# Patient Record
Sex: FEMALE | Race: WHITE | NOT HISPANIC OR LATINO | Employment: FULL TIME | URBAN - METROPOLITAN AREA
[De-identification: names, ages, dates, MRNs, and addresses within clinical notes are randomized per-mention and may not be internally consistent; named-entity substitution may affect disease eponyms.]

---

## 2017-01-25 RX ORDER — LEVONORGESTREL/ETHIN.ESTRADIOL 0.1-0.02MG
1 TABLET ORAL DAILY
Qty: 84 TABLET | Refills: 0 | Status: SHIPPED | OUTPATIENT
Start: 2017-01-25 | End: 2017-02-28

## 2017-01-25 NOTE — TELEPHONE ENCOUNTER
Declan      Last Written Prescription Date: 08/07/16  Last Fill Quantity: 84,  # refills: 1   Last Office Visit with [facility], [facility] or [facility] prescribing provider: 04/29/16                                               Thank you -  Kirstin Coreas, Pharmacy Technician  [facility]  On Behalf Of [facility]

## 2017-02-28 ENCOUNTER — OFFICE VISIT (OUTPATIENT)
Dept: FAMILY MEDICINE | Facility: CLINIC | Age: 32
End: 2017-02-28
Payer: COMMERCIAL

## 2017-02-28 ENCOUNTER — RESULT FOLLOW UP (OUTPATIENT)
Dept: FAMILY MEDICINE | Facility: CLINIC | Age: 32
End: 2017-02-28

## 2017-02-28 VITALS
SYSTOLIC BLOOD PRESSURE: 112 MMHG | BODY MASS INDEX: 19.3 KG/M2 | TEMPERATURE: 99.1 F | HEIGHT: 61 IN | WEIGHT: 102.2 LBS | DIASTOLIC BLOOD PRESSURE: 73 MMHG

## 2017-02-28 DIAGNOSIS — Z30.41 ENCOUNTER FOR SURVEILLANCE OF CONTRACEPTIVE PILLS: ICD-10-CM

## 2017-02-28 DIAGNOSIS — Z01.419 ENCOUNTER FOR GYNECOLOGICAL EXAMINATION WITHOUT ABNORMAL FINDING: Primary | ICD-10-CM

## 2017-02-28 DIAGNOSIS — K21.9 GASTROESOPHAGEAL REFLUX DISEASE WITHOUT ESOPHAGITIS: ICD-10-CM

## 2017-02-28 DIAGNOSIS — J45.30 MILD PERSISTENT ASTHMA WITHOUT COMPLICATION: ICD-10-CM

## 2017-02-28 DIAGNOSIS — N76.0 BV (BACTERIAL VAGINOSIS): ICD-10-CM

## 2017-02-28 DIAGNOSIS — R39.9 SYMPTOMS INVOLVING URINARY SYSTEM: ICD-10-CM

## 2017-02-28 DIAGNOSIS — Z11.3 SCREEN FOR STD (SEXUALLY TRANSMITTED DISEASE): ICD-10-CM

## 2017-02-28 DIAGNOSIS — B96.89 BV (BACTERIAL VAGINOSIS): ICD-10-CM

## 2017-02-28 DIAGNOSIS — R87.810 CERVICAL HIGH RISK HPV (HUMAN PAPILLOMAVIRUS) TEST POSITIVE: ICD-10-CM

## 2017-02-28 LAB
ALBUMIN UR-MCNC: NEGATIVE MG/DL
APPEARANCE UR: CLEAR
BILIRUB UR QL STRIP: NEGATIVE
COLOR UR AUTO: YELLOW
GLUCOSE UR STRIP-MCNC: NEGATIVE MG/DL
HGB UR QL STRIP: ABNORMAL
KETONES UR STRIP-MCNC: NEGATIVE MG/DL
LEUKOCYTE ESTERASE UR QL STRIP: NEGATIVE
MICRO REPORT STATUS: ABNORMAL
NITRATE UR QL: NEGATIVE
NON-SQ EPI CELLS #/AREA URNS LPF: ABNORMAL /LPF
PH UR STRIP: 7 PH (ref 5–7)
RBC #/AREA URNS AUTO: ABNORMAL /HPF (ref 0–2)
SP GR UR STRIP: 1.01 (ref 1–1.03)
SPECIMEN SOURCE: ABNORMAL
URN SPEC COLLECT METH UR: ABNORMAL
UROBILINOGEN UR STRIP-ACNC: 0.2 EU/DL (ref 0.2–1)
WBC #/AREA URNS AUTO: ABNORMAL /HPF (ref 0–2)
WET PREP SPEC: ABNORMAL

## 2017-02-28 PROCEDURE — 86803 HEPATITIS C AB TEST: CPT | Performed by: NURSE PRACTITIONER

## 2017-02-28 PROCEDURE — G0145 SCR C/V CYTO,THINLAYER,RESCR: HCPCS | Performed by: NURSE PRACTITIONER

## 2017-02-28 PROCEDURE — 87210 SMEAR WET MOUNT SALINE/INK: CPT | Performed by: NURSE PRACTITIONER

## 2017-02-28 PROCEDURE — 87624 HPV HI-RISK TYP POOLED RSLT: CPT | Performed by: NURSE PRACTITIONER

## 2017-02-28 PROCEDURE — 87591 N.GONORRHOEAE DNA AMP PROB: CPT | Performed by: NURSE PRACTITIONER

## 2017-02-28 PROCEDURE — 81001 URINALYSIS AUTO W/SCOPE: CPT | Performed by: NURSE PRACTITIONER

## 2017-02-28 PROCEDURE — 86706 HEP B SURFACE ANTIBODY: CPT | Performed by: NURSE PRACTITIONER

## 2017-02-28 PROCEDURE — 36415 COLL VENOUS BLD VENIPUNCTURE: CPT | Performed by: NURSE PRACTITIONER

## 2017-02-28 PROCEDURE — 86780 TREPONEMA PALLIDUM: CPT | Performed by: NURSE PRACTITIONER

## 2017-02-28 PROCEDURE — 87389 HIV-1 AG W/HIV-1&-2 AB AG IA: CPT | Performed by: NURSE PRACTITIONER

## 2017-02-28 PROCEDURE — 99395 PREV VISIT EST AGE 18-39: CPT | Performed by: NURSE PRACTITIONER

## 2017-02-28 PROCEDURE — 87491 CHLMYD TRACH DNA AMP PROBE: CPT | Performed by: NURSE PRACTITIONER

## 2017-02-28 PROCEDURE — 87340 HEPATITIS B SURFACE AG IA: CPT | Performed by: NURSE PRACTITIONER

## 2017-02-28 RX ORDER — FLUTICASONE PROPIONATE 110 UG/1
2 AEROSOL, METERED RESPIRATORY (INHALATION) 2 TIMES DAILY
Qty: 1 INHALER | Refills: 11 | Status: SHIPPED | OUTPATIENT
Start: 2017-02-28 | End: 2019-02-21

## 2017-02-28 RX ORDER — LEVONORGESTREL/ETHIN.ESTRADIOL 0.1-0.02MG
1 TABLET ORAL DAILY
Qty: 84 TABLET | Refills: 4 | Status: SHIPPED | OUTPATIENT
Start: 2017-02-28 | End: 2018-03-09

## 2017-02-28 RX ORDER — ALBUTEROL SULFATE 90 UG/1
2 AEROSOL, METERED RESPIRATORY (INHALATION) EVERY 6 HOURS PRN
Qty: 1 INHALER | Refills: 5 | Status: SHIPPED | OUTPATIENT
Start: 2017-02-28 | End: 2019-02-21

## 2017-02-28 RX ORDER — METRONIDAZOLE 500 MG/1
500 TABLET ORAL 2 TIMES DAILY
Qty: 14 TABLET | Refills: 0 | Status: SHIPPED | OUTPATIENT
Start: 2017-02-28 | End: 2017-09-18

## 2017-02-28 NOTE — PROGRESS NOTES
SUBJECTIVE:     CC: Krista Reddy is an 31 year old woman who presents for preventive health visit.   Chief Complaint   Patient presents with     Physical     physical, pappe, reill all meds, Due for AAP ( letter saved)     UTI     STD     wants testing for STD's ALL        Healthy Habits:    Do you get at least three servings of calcium containing foods daily (dairy, green leafy vegetables, etc.)? yes    Amount of exercise or daily activities, outside of work: 1-2 day(s) per week    Problems taking medications regularly No    Medication side effects: No    Have you had an eye exam in the past two years? yes    Do you see a dentist twice per year? yes    Do you have sleep apnea, excessive snoring or daytime drowsiness?no        URINARY TRACT SYMPTOMS      Duration: 2/20/17    Description  Discomfort at the end of her urine stream     Intensity:  moderate    Accompanying signs and symptoms:  Fever/chills: no   Flank pain no   Nausea and vomiting: no   Vaginal symptoms: none  Abdominal/Pelvic Pain: no     History  History of frequent UTI's: no   History of kidney stones: no   Sexually Active: YES  Possibility of pregnancy: No    Precipitating or alleviating factors: None    Therapies tried and outcome: cranberry juice  and increase fluid intake   Outcome: helps       Today's PHQ-2 Score:   PHQ-2 ( 1999 Pf[employer]) 2/28/2017 3/28/2016   Q1: Little interest or pleasure in doing things 0 0   Q2: Feeling down, depressed or hopeless 0 0   PHQ-2 Score 0 0       Abuse: Current or Past(Physical, Sexual or Emotional)- No  Do you feel safe in your environment - Yes    Social History   Substance Use Topics     Smoking status: Former Smoker     Smokeless tobacco: Never Used     Alcohol use Yes      Comment: 3 times per week .      The patient does not drink >3 drinks per day nor >7 drinks per week.    No results for input(s): CHOL, HDL, LDL, TRIG, CHOLHDLRATIO, NHDL in the last 03836 hours.    Reviewed orders with patient.   "Reviewed health maintenance and updated orders accordingly - Yes    Mammo Decision Support:  Mammogram not appropriate for this patient based on age.      History of abnormal Pap smear: NO - age 30- 65 PAP every 3 years recommended    Reviewed and updated as needed this visit by clinical staff  Tobacco  Allergies  Meds  Med Hx  Surg Hx  Fam Hx  Soc Hx        Reviewed and updated as needed this visit by Provider            ROS:  C: NEGATIVE for fever, chills, change in weight  I: NEGATIVE for worrisome rashes, moles or lesions  E: NEGATIVE for vision changes or irritation  ENT: NEGATIVE for ear, mouth and throat problems  R: NEGATIVE for significant cough or SOB  B: NEGATIVE for masses, tenderness or discharge  CV: NEGATIVE for chest pain, palpitations or peripheral edema  GI: NEGATIVE for nausea, abdominal pain, heartburn, or change in bowel habits  : NEGATIVE for unusual urinary or vaginal symptoms. Periods are regular.  M: NEGATIVE for significant arthralgias or myalgia  N: NEGATIVE for weakness, dizziness or paresthesias  P: NEGATIVE for changes in mood or affect    Problem list, Medication list, Allergies, and Medical/Social/Surgical histories reviewed in [facility] and updated as appropriate.        OBJECTIVE:     /73 (BP Location: Left arm, Patient Position: Chair, Cuff Size: Adult Regular)  Temp 99.1  F (37.3  C) (Tympanic)  Ht 5' 0.75\" (1.543 m)  Wt 102 lb 3.2 oz (46.4 kg)  LMP 02/18/2017 (Exact Date)  Breastfeeding? No  BMI 19.47 kg/m2  EXAM:  GENERAL: healthy, alert and no distress  EYES: Eyes grossly normal to inspection, PERRL and conjunctivae and sclerae normal  HENT: ear canals and TM's normal, nose and mouth without ulcers or lesions  NECK: no adenopathy, no asymmetry, masses, or scars and thyroid normal to palpation  RESP: lungs clear to auscultation - no rales, rhonchi or wheezes  BREAST: normal without masses, tenderness or nipple discharge and no palpable axillary masses or adenopathy  CV: " regular rate and rhythm, normal S1 S2, no S3 or S4, no murmur, click or rub, no peripheral edema and peripheral pulses strong  ABDOMEN: soft, nontender, no hepatosplenomegaly, no masses and bowel sounds normal   (female): normal female external genitalia, normal urethral meatus, vaginal mucosa pink, moist, well rugated, and normal cervix/adnexa/uterus without masses   (female): vaginal discharge - copious and white  MS: no gross musculoskeletal defects noted, no edema  SKIN: no suspicious lesions or rashes  NEURO: Normal strength and tone, mentation intact and speech normal  PSYCH: mentation appears normal, affect normal/bright    ASSESSMENT/PLAN:         ICD-10-CM    1. Encounter for gynecological examination without abnormal finding Z01.419 Pap imaged thin layer screen with HPV - recommended age 30 - 65 years (select HPV order below)     HPV High Risk Types DNA Cervical   2. Mild persistent asthma without complication J45.30 fluticasone (FLOVENT HFA) 110 MCG/ACT Inhaler     albuterol (PROAIR HFA/PROVENTIL HFA/VENTOLIN HFA) 108 (90 BASE) MCG/ACT Inhaler     Asthma Action Plan (AAP)   3. Gastroesophageal reflux disease without esophagitis K21.9 ranitidine (ZANTAC) 150 MG tablet   4. Encounter for surveillance of contraceptive pills Z30.41 levonorgestrel-ethinyl estradiol (AVIANE,ALESSE,LESSINA) 0.1-20 MG-MCG per tablet   5. BV (bacterial vaginosis) N76.0 metroNIDAZOLE (FLAGYL) 500 MG tablet    B96.89    6. Screen for STD (sexually transmitted disease) Z11.3 NEISSERIA GONORRHOEA PCR     CHLAMYDIA TRACHOMATIS PCR     HIV Antigen Antibody Combo     Anti Treponema     Hepatitis C antibody     Hepatitis B Surface Antibody     Hepatitis B surface antigen   7. Symptoms involving urinary system R39.9 *UA reflex to Microscopic and Culture ([facility] and [facility] (except [place] and [place])     Urine Microscopic     Wet prep     Patient tans in a tanning bed regularly - discussed risks of skin cancer  "and advised her to discontinue tanning.      COUNSELING:   Reviewed preventive health counseling, as reflected in patient instructions         reports that she has quit smoking. She has never used smokeless tobacco.    Estimated body mass index is 19.47 kg/(m^2) as calculated from the following:    Height as of this encounter: 5' 0.75\" (1.543 m).    Weight as of this encounter: 102 lb 3.2 oz (46.4 kg).         The risks, benefits and treatment options of prescribed medications or other treatments have been discussed with the patient. The patient verbalized their understanding and should call or follow up if no improvement or if they develop further problems.    RAVI Salvador [facility]  "

## 2017-02-28 NOTE — PATIENT INSTRUCTIONS
Preventive Health Recommendations  Female Ages 26 - 39  Yearly exam:   See your health care provider every year in order to    Review health changes.     Discuss preventive care.      Review your medicines if you your doctor has prescribed any.    Until age 30: Get a Pap test every three years (more often if you have had an abnormal result).    After age 30: Talk to your doctor about whether you should have a Pap test every 3 years or have a Pap test with HPV screening every 5 years.   You do not need a Pap test if your uterus was removed (hysterectomy) and you have not had cancer.  You should be tested each year for STDs (sexually transmitted diseases), if you're at risk.   Talk to your provider about how often to have your cholesterol checked.  If you are at risk for diabetes, you should have a diabetes test (fasting glucose).  Shots: Get a flu shot each year. Get a tetanus shot every 10 years.   Nutrition:     Eat at least 5 servings of fruits and vegetables each day.    Eat whole-grain bread, whole-wheat pasta and brown rice instead of white grains and rice.    Talk to your provider about Calcium and Vitamin D.     Lifestyle    Exercise at least 150 minutes a week (30 minutes a day, 5 days of the week). This will help you control your weight and prevent disease.    Limit alcohol to one drink per day.    No smoking.     Wear sunscreen to prevent skin cancer.    See your dentist every six months for an exam and cleaning.        Thank you for choosing [facility].  You may be receiving a survey in the mail from Wip[employer] Anthony regarding your visit today.  Please take a few minutes to complete and return the survey to let us know how we are doing.      If you have questions or concerns, please contact us via [employer] or you can contact your care team at [phone].    Our Clinic hours are:  Monday 6:40 am  to 7:00 pm  Tuesday -Friday 6:40 am to 5:00 pm    The [place] outpatient lab hours are:  Monday - Friday  6:10 am to 4:45 pm  Saturdays 7:00 am to 11:00 am  Appointments are required, call [phone]    If you have clinical questions after hours or would like to schedule an appointment,  call the clinic at [phone].

## 2017-02-28 NOTE — MR AVS SNAPSHOT
After Visit Summary   2/28/2017    Krista Reddy    MRN: [id number]           Patient Information     Date Of Birth          1985        Visit Information        Provider Department      2/28/2017 10:40 Shakila Mendez APRN [facility]        Today's Diagnoses     Encounter for gynecological examination without abnormal finding    -  1    Mild persistent asthma without complication        Gastroesophageal reflux disease without esophagitis        Encounter for surveillance of contraceptive pills        Screen for STD (sexually transmitted disease)        Symptoms involving urinary system          Care Instructions      Preventive Health Recommendations  Female Ages 26 - 39  Yearly exam:   See your health care provider every year in order to    Review health changes.     Discuss preventive care.      Review your medicines if you your doctor has prescribed any.    Until age 30: Get a Pap test every three years (more often if you have had an abnormal result).    After age 30: Talk to your doctor about whether you should have a Pap test every 3 years or have a Pap test with HPV screening every 5 years.   You do not need a Pap test if your uterus was removed (hysterectomy) and you have not had cancer.  You should be tested each year for STDs (sexually transmitted diseases), if you're at risk.   Talk to your provider about how often to have your cholesterol checked.  If you are at risk for diabetes, you should have a diabetes test (fasting glucose).  Shots: Get a flu shot each year. Get a tetanus shot every 10 years.   Nutrition:     Eat at least 5 servings of fruits and vegetables each day.    Eat whole-grain bread, whole-wheat pasta and brown rice instead of white grains and rice.    Talk to your provider about Calcium and Vitamin D.     Lifestyle    Exercise at least 150 minutes a week (30 minutes a day, 5 days of the week). This will help you control your weight  "and prevent disease.    Limit alcohol to one drink per day.    No smoking.     Wear sunscreen to prevent skin cancer.    See your dentist every six months for an exam and cleaning.        Thank you for choosing [facility].  You may be receiving a survey in the mail from Elly Cruz regarding your visit today.  Please take a few minutes to complete and return the survey to let us know how we are doing.      If you have questions or concerns, please contact us via [employer] or you can contact your care team at [phone].    Our Clinic hours are:  Monday 6:40 am  to 7:00 pm  Tuesday -Friday 6:40 am to 5:00 pm    The [place] outpatient lab hours are:  Monday - Friday 6:10 am to 4:45 pm  Saturdays 7:00 am to 11:00 am  Appointments are required, call [phone]    If you have clinical questions after hours or would like to schedule an appointment,  call the clinic at [phone].          Follow-ups after your visit        Who to contact     If you have questions or need follow up information about today's clinic visit or your schedule please contact [facility] directly at [phone].  Normal or non-critical lab and imaging results will be communicated to you by [employer], letter or phone within 4 business days after the clinic has received the results. If you do not hear from us within 7 days, please contact the clinic through [employer] or phone. If you have a critical or abnormal lab result, we will notify you by phone as soon as possible.  Submit refill requests through [employer] or call your pharmacy and they will forward the refill request to us. Please allow 3 business days for your refill to be completed.          Additional Information About Your Visit        Therative Information     [employer] lets you send messages to your doctor, view your test results, renew your prescriptions, schedule appointments and more. To sign up, go to [email or web address] . Click on \"Log in\" on the left side of the " "screen, which will take you to the Welcome page. Then click on \"Sign up Now\" on the right side of the page.     You will be asked to enter the access code listed below, as well as some personal information. Please follow the directions to create your username and password.     Your access code is: [email or web address]  Expires: 2017 11:06 AM     Your access code will  in 90 days. If you need help or a new code, please call your [facility] clinic or [phone].        Care EveryWhere ID     This is your Care EveryWhere ID. This could be used by other organizations to access your [facility] medical records  [id number]        Your Vitals Were     Temperature Height Last Period Breastfeeding? BMI (Body Mass Index)       99.1  F (37.3  C) (Tympanic) 5' 0.75\" (1.543 m) 2017 (Exact Date) No 19.47 kg/m2        Blood Pressure from Last 3 Encounters:   17 112/73   16 138/86   16 124/79    Weight from Last 3 Encounters:   17 102 lb 3.2 oz (46.4 kg)   16 101 lb (45.8 kg)   16 98 lb (44.5 kg)              We Performed the Following     *UA reflex to Microscopic and Culture ([facility] and [facility] (except [place] and [place])     Anti Treponema     Asthma Action Plan (AAP)     CHLAMYDIA TRACHOMATIS PCR     Hepatitis B Surface Antibody     Hepatitis B surface antigen     Hepatitis C antibody     HIV Antigen Antibody Combo     HPV High Risk Types DNA Cervical     NEISSERIA GONORRHOEA PCR     Pap imaged thin layer screen with HPV - recommended age 30 - 65 years (select HPV order below)     Urine Microscopic     Wet prep          Where to get your medicines      These medications were sent to [facility] [place], [place] - [address]  [address] [address]     Phone:  [phone]     albuterol 108 (90 BASE) MCG/ACT Inhaler    fluticasone 110 MCG/ACT Inhaler    levonorgestrel-ethinyl estradiol 0.1-20 MG-MCG per tablet    ranitidine 150 MG " tablet          Primary Care Provider Office Phone # Fax #    RAVI Escoto [facility] [phone]       [facility] [address] [address]        Thank you!     Thank you for choosing [facility]  for your care. Our goal is always to provide you with excellent care. Hearing back from our patients is one way we can continue to improve our services. Please take a few minutes to complete the written survey that you may receive in the mail after your visit with us. Thank you!             Your Updated Medication List - Protect others around you: Learn how to safely use, store and throw away your medicines at www.disposemymeds.org.          This list is accurate as of: 2/28/17 11:06 AM.  Always use your most recent med list.                   Brand Name Dispense Instructions for use    albuterol 108 (90 BASE) MCG/ACT Inhaler    PROAIR HFA/PROVENTIL HFA/VENTOLIN HFA    1 Inhaler    Inhale 2 puffs into the lungs every 6 hours as needed for shortness of breath / dyspnea       fluticasone 110 MCG/ACT Inhaler    FLOVENT HFA    1 Inhaler    Inhale 2 puffs into the lungs 2 times daily       levonorgestrel-ethinyl estradiol 0.1-20 MG-MCG per tablet    AVIANE,ALESSE,LESSINA    84 tablet    Take 1 tablet by mouth daily       ranitidine 150 MG tablet    ZANTAC    60 tablet    Take 1 tablet (150 mg) by mouth 2 times daily

## 2017-02-28 NOTE — NURSING NOTE
"Chief Complaint   Patient presents with     Physical     physical, pappe, reill all meds, Due for AAP ( letter saved)     UTI     STD     wants testing for STD's ALL        Initial /73 (BP Location: Left arm, Patient Position: Chair, Cuff Size: Adult Regular)  Temp 99.1  F (37.3  C) (Tympanic)  Ht 5' 0.75\" (1.543 m)  Wt 102 lb 3.2 oz (46.4 kg)  LMP 02/18/2017 (Exact Date)  Breastfeeding? No  BMI 19.47 kg/m2 Estimated body mass index is 19.47 kg/(m^2) as calculated from the following:    Height as of this encounter: 5' 0.75\" (1.543 m).    Weight as of this encounter: 102 lb 3.2 oz (46.4 kg).  Medication Reconciliation: complete    "

## 2017-02-28 NOTE — LETTER
My Asthma Action Plan  Name: Krista Reddy   YOB: 1985  Date: 2/28/2017   My doctor: RAVI Salvador CNP   My clinic: [facility]        My Control Medicine: Flovent   My Rescue Medicine: Albuterol (Proair/Ventolin/Proventil) inhaler l   My Asthma Severity: mild persistent  Avoid your asthma triggers: upper respiratory infections and Seasonal Changes,Smoke               GREEN ZONE     Good Control    I feel good    No cough or wheeze    Can work, sleep and play without asthma symptoms       Take your asthma control medicine every day.     1. If exercise triggers your asthma, take your rescue medication    15 minutes before exercise or sports, and    During exercise if you have asthma symptoms  2. Spacer to use with inhaler: If you have a spacer, make sure to use it with your inhaler             YELLOW ZONE     Getting Worse  I have ANY of these:    I do not feel good    Cough or wheeze    Chest feels tight    Wake up at night   1. Keep taking your Green Zone medications  2. Start taking your rescue medicine:    every 20 minutes for up to 1 hour. Then every 4 hours for 24-48 hours.  3. If you stay in the Yellow Zone for more than 12-24 hours, contact your doctor.  4. If you do not return to the Green Zone in 12-24 hours or you get worse, start taking your oral steroid medicine if prescribed by your provider.           RED ZONE     Medical Alert - Get Help  I have ANY of these:    I feel awful    Medicine is not helping    Breathing getting harder    Trouble walking or talking    Nose opens wide to breathe       1. Take your rescue medicine NOW  2. If your provider has prescribed an oral steroid medicine, start taking it NOW  3. Call your doctor NOW  4. If you are still in the Red Zone after 20 minutes and you have not reached your doctor:    Take your rescue medicine again and    Call 911 or go to the emergency room right away    See your regular doctor within 2 weeks of an  Emergency Room or Urgent Care visit for follow-up treatment.        Electronically signed by:   Annual Reminders:  Meet with Asthma Educator,  Flu Shot in the Fall, consider Pneumonia Vaccination for patients with asthma (aged 19 and older).    Pharmacy: [facility] [place], [place] - [address]                    Asthma Triggers  How To Control Things That Make Your Asthma Worse    Triggers are things that make your asthma worse.  Look at the list below to help you find your triggers and what you can do about them.  You can help prevent asthma flare-ups by staying away from your triggers.      Trigger                                                          What you can do   Cigarette Smoke  Tobacco smoke can make asthma worse. Do not allow smoking in your home, car or around you.  Be sure no one smokes at a child s day care or school.  If you smoke, ask your health care provider for ways to help you quit.  Ask family members to quit too.  Ask your health care provider for a referral to Quit Plan to help you quit smoking, or call 5-328-805-PLAN.     Colds, Flu, Bronchitis  These are common triggers of asthma. Wash your hands often.  Don t touch your eyes, nose or mouth.  Get a flu shot every year.     Dust Mites  These are tiny bugs that live in cloth or carpet. They are too small to see. Wash sheets and blankets in hot water every week.   Encase pillows and mattress in dust mite proof covers.  Avoid having carpet if you can. If you have carpet, vacuum weekly.   Use a dust mask and HEPA vacuum.   Pollen and Outdoor Mold  Some people are allergic to trees, grass, or weed pollen, or molds. Try to keep your windows closed.  Limit time out doors when pollen count is high.   Ask you health care provider about taking medicine during allergy season.     Animal Dander  Some people are allergic to skin flakes, urine or saliva from pets with fur or feathers. Keep pets with fur or feathers out of your home.     If you can t keep the pet outdoors, then keep the pet out of your bedroom.  Keep the bedroom door closed.  Keep pets off cloth furniture and away from stuffed toys.     Mice, Rats, and Cockroaches  Some people are allergic to the waste from these pests.   Cover food and garbage.  Clean up spills and food crumbs.  Store grease in the refrigerator.   Keep food out of the bedroom.   Indoor Mold  This can be a trigger if your home has high moisture. Fix leaking faucets, pipes, or other sources of water.   Clean moldy surfaces.  Dehumidify basement if it is damp and smelly.   Smoke, Strong Odors, and Sprays  These can reduce air quality. Stay away from strong odors and sprays, such as perfume, powder, hair spray, paints, smoke incense, paint, cleaning products, candles and new carpet.   Exercise or Sports  Some people with asthma have this trigger. Be active!  Ask your doctor about taking medicine before sports or exercise to prevent symptoms.    Warm up for 5-10 minutes before and after sports or exercise.     Other Triggers of Asthma  Cold air:  Cover your nose and mouth with a scarf.  Sometimes laughing or crying can be a trigger.  Some medicines and food can trigger asthma.

## 2017-02-28 NOTE — LETTER
[facility]  [address] [address]  [phone]      March 6, 2017    Krista Reddy  [address]  [place] [facility] [place] [address]    Dear Krista,  This letter is in regards to the PAP smear you had done on 2/28/17. Your PAP test result is normal.  Your HPV (Human Papilloma Virus) result showed evidence of a high risk type of HPV. This strain of HPV can sometimes be linked with the possible future development of cervical cancer. Over 50% of the population has some form of HPV at any given time. Your own immune system will often clear this virus. Getting plenty of rest, eating a nutritious, well balanced diet and smoking cessation are all ways that can help boost your immune system and help clear HPV.  Please return in 1 year for your next pap smear & HPV test.  If you have additional questions regarding this result, please call me at [phone].    Sincerely,  Shakila Reilly, RAVI CNP/rlm

## 2017-03-01 ENCOUNTER — TELEPHONE (OUTPATIENT)
Dept: FAMILY MEDICINE | Facility: CLINIC | Age: 32
End: 2017-03-01

## 2017-03-01 DIAGNOSIS — A74.9 CHLAMYDIA: Primary | ICD-10-CM

## 2017-03-01 LAB
C TRACH DNA SPEC QL NAA+PROBE: ABNORMAL
HBV SURFACE AB SERPL IA-ACNC: 0.78 M[IU]/ML
HBV SURFACE AG SERPL QL IA: NONREACTIVE
HCV AB SERPL QL IA: NORMAL
HIV 1+2 AB+HIV1 P24 AG SERPL QL IA: NORMAL
N GONORRHOEA DNA SPEC QL NAA+PROBE: NORMAL
SPECIMEN SOURCE: ABNORMAL
SPECIMEN SOURCE: NORMAL
T PALLIDUM IGG+IGM SER QL: NEGATIVE

## 2017-03-01 RX ORDER — AZITHROMYCIN 500 MG/1
1000 TABLET, FILM COATED ORAL ONCE
Qty: 2 TABLET | Refills: 0 | Status: SHIPPED | OUTPATIENT
Start: 2017-03-01 | End: 2017-03-01

## 2017-03-01 ASSESSMENT — ASTHMA QUESTIONNAIRES: ACT_TOTALSCORE: 22

## 2017-03-01 NOTE — TELEPHONE ENCOUNTER
I called and notified patient.  Antibiotics sent in.  Advised that she needs to notify her partner and her partner needs to be treated.  No intercourse until both she and her partner has been treated for at least one week.  Shakila Reilly, CNP

## 2017-03-02 LAB
COPATH REPORT: NORMAL
PAP: NORMAL

## 2017-03-06 LAB
FINAL DIAGNOSIS: ABNORMAL
HPV HR 12 DNA CVX QL NAA+PROBE: POSITIVE
HPV16 DNA SPEC QL NAA+PROBE: NEGATIVE
HPV18 DNA SPEC QL NAA+PROBE: NEGATIVE
SPECIMEN DESCRIPTION: ABNORMAL

## 2017-03-06 NOTE — PROGRESS NOTES
5/22/14: ASCUS Pap, Neg HPV.  2/28/17: NIL Pap, + HR HPV (Neg 16/18). Plan cotest in 1 year.   2/13/18 Cotest reminder letter sent (rlm)  3/29/18 ASCUS Pap, + HR HPV 16 & other HR HPV (not 18). Plan colp  4/5/18 Message left to return call. Pt notified by phone.  Patient will call the [facility] to schedule a colp.  4/24/18 Holcomb bx and ECC negative. Plan : Co test in 1 year due by 4/24/19 (MRA)  4/30/18 Pt notified.  2/21/19 NIL Pap, + HR HPV 16 & other (neg 18). Plan colp  2/27/19 Message left to return call. Pt notified.   3/19/19 Holcomb BX and ECC- Benign. Plan cotest in 1 year. (MRA)  3/25/19 Message left to return call. Pt notified.  1/16/20 ASCUS pap with Neg HPV. Plan cotest in 1 year. (MRA) (efren)  1/24/20 Result letter sent per RN request (rlm)

## 2017-09-18 ENCOUNTER — OFFICE VISIT (OUTPATIENT)
Dept: FAMILY MEDICINE | Facility: CLINIC | Age: 32
End: 2017-09-18
Payer: COMMERCIAL

## 2017-09-18 VITALS
HEIGHT: 61 IN | WEIGHT: 104 LBS | TEMPERATURE: 99.3 F | HEART RATE: 81 BPM | BODY MASS INDEX: 19.63 KG/M2 | DIASTOLIC BLOOD PRESSURE: 88 MMHG | SYSTOLIC BLOOD PRESSURE: 119 MMHG

## 2017-09-18 DIAGNOSIS — Z11.3 SCREEN FOR STD (SEXUALLY TRANSMITTED DISEASE): Primary | ICD-10-CM

## 2017-09-18 PROCEDURE — 87491 CHLMYD TRACH DNA AMP PROBE: CPT | Performed by: NURSE PRACTITIONER

## 2017-09-18 PROCEDURE — 87591 N.GONORRHOEAE DNA AMP PROB: CPT | Performed by: NURSE PRACTITIONER

## 2017-09-18 PROCEDURE — 99213 OFFICE O/P EST LOW 20 MIN: CPT | Performed by: NURSE PRACTITIONER

## 2017-09-18 NOTE — PATIENT INSTRUCTIONS
Thank you for choosing [facility].  You may be receiving a survey in the mail from Elly Cruz regarding your visit today.  Please take a few minutes to complete and return the survey to let us know how we are doing.      If you have questions or concerns, please contact us via [employer] or you can contact your care team at [phone].    Our Clinic hours are:  Monday 6:40 am  to 7:00 pm  Tuesday -Friday 6:40 am to 5:00 pm    The [place] outpatient lab hours are:  Monday - Friday 6:10 am to 4:45 pm  Saturdays 7:00 am to 11:00 am  Appointments are required, call [phone]    If you have clinical questions after hours or would like to schedule an appointment,  call the clinic at [phone].

## 2017-09-18 NOTE — NURSING NOTE
"Chief Complaint   Patient presents with     STD     patient would like to make sure chalmydia infection has cleared up;  no symptoms       Initial /88  Pulse 81  Temp 99.3  F (37.4  C) (Oral)  Ht 5' 0.75\" (1.543 m)  Wt 104 lb (47.2 kg)  LMP 08/28/2017 (Approximate)  BMI 19.81 kg/m2 Estimated body mass index is 19.81 kg/(m^2) as calculated from the following:    Height as of this encounter: 5' 0.75\" (1.543 m).    Weight as of this encounter: 104 lb (47.2 kg).  Medication Reconciliation: complete  "

## 2017-09-18 NOTE — MR AVS SNAPSHOT
After Visit Summary   9/18/2017    Krista Reddy    MRN: [id number]           Patient Information     Date Of Birth          1985        Visit Information        Provider Department      9/18/2017 10:00 AM Shakila Reilly APRN CNP [facility]        Today's Diagnoses     Screen for STD (sexually transmitted disease)    -  1      Care Instructions          Thank you for choosing [facility].  You may be receiving a survey in the mail from Elly Cruz regarding your visit today.  Please take a few minutes to complete and return the survey to let us know how we are doing.      If you have questions or concerns, please contact us via [employer] or you can contact your care team at [phone].    Our Clinic hours are:  Monday 6:40 am  to 7:00 pm  Tuesday -Friday 6:40 am to 5:00 pm    The [place] outpatient lab hours are:  Monday - Friday 6:10 am to 4:45 pm  Saturdays 7:00 am to 11:00 am  Appointments are required, call [phone]    If you have clinical questions after hours or would like to schedule an appointment,  call the clinic at [phone].            Follow-ups after your visit        Who to contact     If you have questions or need follow up information about today's clinic visit or your schedule please contact [facility] directly at [phone].  Normal or non-critical lab and imaging results will be communicated to you by MyChart, letter or phone within 4 business days after the clinic has received the results. If you do not hear from us within 7 days, please contact the clinic through [employer] or phone. If you have a critical or abnormal lab result, we will notify you by phone as soon as possible.  Submit refill requests through [employer] or call your pharmacy and they will forward the refill request to us. Please allow 3 business days for your refill to be completed.          Additional Information About Your Visit        MyChart Information   "   [employer] lets you send messages to your doctor, view your test results, renew your prescriptions, schedule appointments and more. To sign up, go to [email or web address] . Click on \"Log in\" on the left side of the screen, which will take you to the Welcome page. Then click on \"Sign up Now\" on the right side of the page.     You will be asked to enter the access code listed below, as well as some personal information. Please follow the directions to create your username and password.     Your access code is: [email or web address]  Expires: 2017 10:28 AM     Your access code will  in 90 days. If you need help or a new code, please call your [facility] clinic or [phone].        Care EveryWhere ID     This is your [facility] EveryWhere ID. This could be used by other organizations to access your [facility] medical records  [id number]        Your Vitals Were     Pulse Temperature Height Last Period BMI (Body Mass Index)       81 99.3  F (37.4  C) (Oral) 5' 0.75\" (1.543 m) 2017 (Approximate) 19.81 kg/m2        Blood Pressure from Last 3 Encounters:   17 119/88   17 112/73   16 138/86    Weight from Last 3 Encounters:   17 104 lb (47.2 kg)   17 102 lb 3.2 oz (46.4 kg)   16 101 lb (45.8 kg)              We Performed the Following     CHLAMYDIA TRACHOMATIS PCR     NEISSERIA GONORRHOEA PCR        Primary Care Provider Office Phone # Fax #    Shakila Toy Reilly, APRN [phone] [address] [facility] [address]        Equal Access to Services     BINA HUANG : Bao Colbert, johnathan berry, jeanne kaalmada silas, juanita david. So [facility] [phone].    ATENCIÓN: Si habla español, tiene a taylor disposición servicios gratuitos de asistencia lingüística. Llame al [phone].    We comply with applicable federal civil rights laws and [place] laws. We do not discriminate on the basis of race, color, " national origin, age, disability sex, sexual orientation or gender identity.            Thank you!     Thank you for choosing [facility]  for your care. Our goal is always to provide you with excellent care. Hearing back from our patients is one way we can continue to improve our services. Please take a few minutes to complete the written survey that you may receive in the mail after your visit with us. Thank you!             Your Updated Medication List - Protect others around you: Learn how to safely use, store and throw away your medicines at www.disposemymeds.org.          This list is accurate as of: 9/18/17 10:28 AM.  Always use your most recent med list.                   Brand Name Dispense Instructions for use Diagnosis    albuterol 108 (90 BASE) MCG/ACT Inhaler    PROAIR HFA/PROVENTIL HFA/VENTOLIN HFA    1 Inhaler    Inhale 2 puffs into the lungs every 6 hours as needed for shortness of breath / dyspnea    Mild persistent asthma without complication       fluticasone 110 MCG/ACT Inhaler    FLOVENT HFA    1 Inhaler    Inhale 2 puffs into the lungs 2 times daily    Mild persistent asthma without complication       levonorgestrel-ethinyl estradiol 0.1-20 MG-MCG per tablet    AVIANE,ALESSE,LESSINA    84 tablet    Take 1 tablet by mouth daily    Encounter for surveillance of contraceptive pills       ranitidine 150 MG tablet    ZANTAC    60 tablet    Take 1 tablet (150 mg) by mouth 2 times daily    Gastroesophageal reflux disease without esophagitis

## 2017-09-18 NOTE — PROGRESS NOTES
"  SUBJECTIVE:   Krista Reddy is a 32 year old female who presents to clinic today for the following health issues:      Chief Complaint   Patient presents with     STD     patient would like to make sure chalmydia infection has cleared up;  Chlamydia diagnosed during routine screening at the 2/28/2017 appointment.  Her partner was treated.  She has had no symptoms since taking her antibiotics.           Problem list and histories reviewed & adjusted, as indicated.  Additional history: as documented      Reviewed and updated as needed this visit by clinical staff  Tobacco  Allergies  Meds       Reviewed and updated as needed this visit by Provider         ROS:  Constitutional, HEENT, cardiovascular, pulmonary, gi and gu systems are negative, except as otherwise noted.      OBJECTIVE:   /88  Pulse 81  Temp 99.3  F (37.4  C) (Oral)  Ht 5' 0.75\" (1.543 m)  Wt 104 lb (47.2 kg)  LMP 08/28/2017 (Approximate)  BMI 19.81 kg/m2  Body mass index is 19.81 kg/(m^2).  GENERAL: healthy, alert and no distress   (female): normal female external genitalia, normal urethral meatus, normal vaginal mucosa, normal cervix without discharge        ASSESSMENT/PLAN:       ICD-10-CM    1. Screen for STD (sexually transmitted disease) Z11.3 NEISSERIA GONORRHOEA PCR     CHLAMYDIA TRACHOMATIS PCR       The risks, benefits and treatment options of prescribed medications or other treatments have been discussed with the patient. The patient verbalized their understanding and should call or follow up if no improvement or if they develop further problems.    RAVI Salvador [facility]  "

## 2017-09-19 DIAGNOSIS — A74.9 CHLAMYDIA: Primary | ICD-10-CM

## 2017-09-19 LAB
C TRACH DNA SPEC QL NAA+PROBE: POSITIVE
N GONORRHOEA DNA SPEC QL NAA+PROBE: NEGATIVE
SPECIMEN SOURCE: ABNORMAL
SPECIMEN SOURCE: NORMAL

## 2017-09-19 RX ORDER — AZITHROMYCIN 500 MG/1
1000 TABLET, FILM COATED ORAL ONCE
Qty: 2 TABLET | Refills: 0 | Status: SHIPPED | OUTPATIENT
Start: 2017-09-19 | End: 2017-09-19

## 2017-09-19 ASSESSMENT — ASTHMA QUESTIONNAIRES: ACT_TOTALSCORE: 25

## 2017-12-07 ENCOUNTER — OFFICE VISIT (OUTPATIENT)
Dept: FAMILY MEDICINE | Facility: CLINIC | Age: 32
End: 2017-12-07
Payer: COMMERCIAL

## 2017-12-07 VITALS
TEMPERATURE: 97.2 F | SYSTOLIC BLOOD PRESSURE: 117 MMHG | WEIGHT: 104 LBS | BODY MASS INDEX: 19.81 KG/M2 | DIASTOLIC BLOOD PRESSURE: 77 MMHG | HEART RATE: 84 BPM

## 2017-12-07 DIAGNOSIS — Z11.3 SCREEN FOR STD (SEXUALLY TRANSMITTED DISEASE): Primary | ICD-10-CM

## 2017-12-07 PROCEDURE — 87491 CHLMYD TRACH DNA AMP PROBE: CPT | Performed by: NURSE PRACTITIONER

## 2017-12-07 PROCEDURE — 99213 OFFICE O/P EST LOW 20 MIN: CPT | Performed by: NURSE PRACTITIONER

## 2017-12-07 PROCEDURE — 87591 N.GONORRHOEAE DNA AMP PROB: CPT | Performed by: NURSE PRACTITIONER

## 2017-12-07 NOTE — PROGRESS NOTES
SUBJECTIVE:   Krista Reddy is a 32 year old female who presents to clinic today for the following health issues:      Chief Complaint   Patient presents with     STD     wants to recheck chlamydia infection from September  Took whole course of treatment prescribed.  Denies vaginal discharge or odor  No new partners  Current partner was treated as well.           Problem list and histories reviewed & adjusted, as indicated.  Additional history: as documented      Reviewed and updated as needed this visit by clinical staff  Allergies  Meds       Reviewed and updated as needed this visit by Provider         ROS:  Constitutional, HEENT, cardiovascular, pulmonary, gi and gu systems are negative, except as otherwise noted.      OBJECTIVE:   /77  Pulse 84  Temp 97.2  F (36.2  C) (Oral)  Wt 104 lb (47.2 kg)  LMP 11/16/2017 (Approximate)  BMI 19.81 kg/m2  Body mass index is 19.81 kg/(m^2).  GENERAL: healthy, alert and no distress      ASSESSMENT/PLAN:       ICD-10-CM    1. Screen for STD (sexually transmitted disease) Z11.3 NEISSERIA GONORRHOEA PCR     CHLAMYDIA TRACHOMATIS PCR     The risks, benefits and treatment options of prescribed medications or other treatments have been discussed with the patient. The patient verbalized their understanding and should call or follow up if no improvement or if they develop further problems.    RAVI Salvador [facility]

## 2017-12-07 NOTE — MR AVS SNAPSHOT
"              After Visit Summary   2017    Krista Reddy    MRN: [id number]           Patient Information     Date Of Birth          1985        Visit Information        Provider Department      2017 11:00 AM Shakila Reilly APRN CNP [facility]        Today's Diagnoses     Screen for STD (sexually transmitted disease)    -  1       Follow-ups after your visit        Who to contact     If you have questions or need follow up information about today's clinic visit or your schedule please contact [facility] directly at [phone].  Normal or non-critical lab and imaging results will be communicated to you by [employer], letter or phone within 4 business days after the clinic has received the results. If you do not hear from us within 7 days, please contact the clinic through [employer] or phone. If you have a critical or abnormal lab result, we will notify you by phone as soon as possible.  Submit refill requests through [employer] or call your pharmacy and they will forward the refill request to us. Please allow 3 business days for your refill to be completed.          Additional Information About Your Visit        MyChart Information     [employer] lets you send messages to your doctor, view your test results, renew your prescriptions, schedule appointments and more. To sign up, go to [email or web address] . Click on \"Log in\" on the left side of the screen, which will take you to the Welcome page. Then click on \"Sign up Now\" on the right side of the page.     You will be asked to enter the access code listed below, as well as some personal information. Please follow the directions to create your username and password.     Your access code is: [email or web address]  Expires: 2017  9:28 AM     Your access code will  in 90 days. If you need help or a new code, please call your [facility] or [phone].        Care EveryWhere ID     This is your Care EveryWhere " ID. This could be used by other organizations to access your [facility] medical records  [id number]        Your Vitals Were     Pulse Temperature Last Period BMI (Body Mass Index)          84 97.2  F (36.2  C) (Oral) 11/16/2017 (Approximate) 19.81 kg/m2         Blood Pressure from Last 3 Encounters:   12/07/17 117/77   09/18/17 119/88   02/28/17 112/73    Weight from Last 3 Encounters:   12/07/17 104 lb (47.2 kg)   09/18/17 104 lb (47.2 kg)   02/28/17 102 lb 3.2 oz (46.4 kg)              We Performed the Following     CHLAMYDIA TRACHOMATIS PCR     NEISSERIA GONORRHOEA PCR        Primary Care Provider Office Phone # Fax #    Shakila Yeager RAVI [place] [phone] [address] [facility] [address]        Equal Access to Services     BINA HUANG : Hadii aad ku hadasho Soomaali, waaxda luqadaha, qaybta kaalmada adeegyada, waxay walein hayshreyas kimball . So [facility] [phone].    ATENCIÓN: Si habla español, tiene a taylor disposición servicios gratuitos de asistencia lingüística. Adryan al [phone].    We comply with applicable federal civil rights laws and [place] laws. We do not discriminate on the basis of race, color, national origin, age, disability, sex, sexual orientation, or gender identity.            Thank you!     Thank you for choosing [facility]  for your care. Our goal is always to provide you with excellent care. Hearing back from our patients is one way we can continue to improve our services. Please take a few minutes to complete the written survey that you may receive in the mail after your visit with us. Thank you!             Your Updated Medication List - Protect others around you: Learn how to safely use, store and throw away your medicines at www.disposemymeds.org.          This list is accurate as of: 12/7/17 11:39 AM.  Always use your most recent med list.                   Brand Name Dispense Instructions for use Diagnosis    albuterol  108 (90 BASE) MCG/ACT Inhaler    PROAIR HFA/PROVENTIL HFA/VENTOLIN HFA    1 Inhaler    Inhale 2 puffs into the lungs every 6 hours as needed for shortness of breath / dyspnea    Mild persistent asthma without complication       fluticasone 110 MCG/ACT Inhaler    FLOVENT HFA    1 Inhaler    Inhale 2 puffs into the lungs 2 times daily    Mild persistent asthma without complication       levonorgestrel-ethinyl estradiol 0.1-20 MG-MCG per tablet    AVIANE,ALESSE,LESSINA    84 tablet    Take 1 tablet by mouth daily    Encounter for surveillance of contraceptive pills       ranitidine 150 MG tablet    ZANTAC    60 tablet    Take 1 tablet (150 mg) by mouth 2 times daily    Gastroesophageal reflux disease without esophagitis

## 2017-12-07 NOTE — NURSING NOTE
"Chief Complaint   Patient presents with     STD     wants to recheck chlamydia infection from september       Initial /77  Pulse 84  Temp 97.2  F (36.2  C) (Oral)  Wt 104 lb (47.2 kg)  LMP 11/16/2017 (Approximate)  BMI 19.81 kg/m2 Estimated body mass index is 19.81 kg/(m^2) as calculated from the following:    Height as of 9/18/17: 5' 0.75\" (1.543 m).    Weight as of this encounter: 104 lb (47.2 kg).  Medication Reconciliation: complete  "

## 2017-12-07 NOTE — LETTER
[facility]  [address] [place] [address]  [phone]        December 11, 2017    Krista Phamco  [address]  [place] [facility] [address]            Dear Krista,    The results of you recent STD screening for trichomonas, chlamydia and gonorrhoeae tests were all flavio/negativel.    If you have any questions or concerns, please call me or my nurse at [phone].    Sincerely,        Shakila Reilly CNP

## 2017-12-08 LAB
C TRACH DNA SPEC QL NAA+PROBE: NEGATIVE
N GONORRHOEA DNA SPEC QL NAA+PROBE: NEGATIVE
SPECIMEN SOURCE: NORMAL
SPECIMEN SOURCE: NORMAL

## 2017-12-14 ENCOUNTER — TELEPHONE (OUTPATIENT)
Dept: FAMILY MEDICINE | Facility: CLINIC | Age: 32
End: 2017-12-14

## 2017-12-14 NOTE — TELEPHONE ENCOUNTER
Patient is calling to find out the results of her Ghonnorhea and Chlamydia results. I gave her the results.  Danielle Landin  [facility] [place] [facility]

## 2018-03-09 DIAGNOSIS — Z30.41 ENCOUNTER FOR SURVEILLANCE OF CONTRACEPTIVE PILLS: ICD-10-CM

## 2018-03-09 RX ORDER — LEVONORGESTREL/ETHIN.ESTRADIOL 0.1-0.02MG
1 TABLET ORAL DAILY
Qty: 84 TABLET | Refills: 0 | Status: SHIPPED | OUTPATIENT
Start: 2018-03-09 | End: 2018-03-13

## 2018-03-09 NOTE — TELEPHONE ENCOUNTER
Medication is being filled for 1 time refill only due to:  last Px/pap 2/28/17.    Melly JEFF RN

## 2018-03-09 NOTE — TELEPHONE ENCOUNTER
"Requested Prescriptions   Pending Prescriptions Disp Refills     levonorgestrel-ethinyl estradiol (AVIANE,ANGELIQUE,LESSINA) 0.1-20 MG-MCG per tablet  Last Written Prescription Date:  02/28/17  Last Fill Quantity: 84,  # refills: 4   Last office visit: 12/7/2017 with prescribing provider:  12/07/17   Future Office Visit:     84 tablet 4     Sig: Take 1 tablet by mouth daily    Contraceptives Protocol Passed    3/9/2018  2:08 PM       Passed - Patient is not a current smoker if age is 35 or older       Passed - Recent (12 mo) or future (30 days) visit within the authorizing provider's specialty    Patient had office visit in the last 12 months or has a visit in the next 30 days with authorizing provider or within the authorizing provider's specialty.  See \"Patient Info\" tab in inbasket, or \"Choose Columns\" in Meds & Orders section of the refill encounter.           Passed - No active pregnancy on record       Passed - No positive pregnancy test in past 12 months          "

## 2018-03-13 ENCOUNTER — OFFICE VISIT (OUTPATIENT)
Dept: FAMILY MEDICINE | Facility: CLINIC | Age: 33
End: 2018-03-13
Payer: COMMERCIAL

## 2018-03-13 VITALS
HEART RATE: 72 BPM | BODY MASS INDEX: 18.81 KG/M2 | WEIGHT: 99.6 LBS | DIASTOLIC BLOOD PRESSURE: 68 MMHG | SYSTOLIC BLOOD PRESSURE: 101 MMHG | HEIGHT: 61 IN | TEMPERATURE: 98.1 F

## 2018-03-13 DIAGNOSIS — Z30.013 ENCOUNTER FOR INITIAL PRESCRIPTION OF INJECTABLE CONTRACEPTIVE: Primary | ICD-10-CM

## 2018-03-13 PROCEDURE — 96372 THER/PROPH/DIAG INJ SC/IM: CPT | Performed by: INTERNAL MEDICINE

## 2018-03-13 PROCEDURE — 99213 OFFICE O/P EST LOW 20 MIN: CPT | Mod: 25 | Performed by: INTERNAL MEDICINE

## 2018-03-13 RX ORDER — MEDROXYPROGESTERONE ACETATE 150 MG/ML
150 INJECTION, SUSPENSION INTRAMUSCULAR
Qty: 1 ML | Refills: 3 | OUTPATIENT
Start: 2018-03-13 | End: 2018-12-17

## 2018-03-13 NOTE — MR AVS SNAPSHOT
After Visit Summary   3/13/2018    Krista Reddy    MRN: [id number]           Patient Information     Date Of Birth          1985        Visit Information        Provider Department      3/13/2018 9:40 AM Toy Prado MD [facility]        Today's Diagnoses     Screening for cervical cancer    -  1    Encounter for initial prescription of injectable contraceptive          Care Instructions          Thank you for choosing [facility].  You may be receiving a survey in the mail from [facility] regarding your visit today.  Please take a few minutes to complete and return the survey to let us know how we are doing.      If you have questions or concerns, please contact us via InviBox or you can contact your care team at [phone].    Our Clinic hours are:  Monday 6:40 am  to 7:00 pm  Tuesday -Friday 6:40 am to 5:00 pm    The [place] outpatient lab hours are:  Monday - Friday 6:10 am to 4:45 pm  Saturdays 7:00 am to 11:00 am  Appointments are required, call [phone]    If you have clinical questions after hours or would like to schedule an appointment,  call the clinic at [phone].    Medroxyprogesterone injection [Contraceptive]  Brand Names: Depo-Provera, Depo-subQ Provera 104  What is this medicine?  MEDROXYPROGESTERONE (me DROX ee proe SOLO te jey) contraceptive injections prevent pregnancy. They provide effective birth control for 3 months. Depo-subQ Provera 104 is also used for treating pain related to endometriosis.  How should I use this medicine?  Depo-Provera Contraceptive injection is given into a muscle. Depo-subQ Provera 104 injection is given under the skin. These injections are given by a health care professional. You must not be pregnant before getting an injection. The injection is usually given during the first 5 days after the start of a menstrual period or 6 weeks after delivery of a baby.  Talk to your pediatrician regarding the use of this medicine  in children. Special care may be needed. These injections have been used in female children who have started having menstrual periods.  What side effects may I notice from receiving this medicine?  Side effects that you should report to your doctor or health care professional as soon as possible:    allergic reactions like skin rash, itching or hives, swelling of the face, lips, or tongue    breast tenderness or discharge    breathing problems    changes in vision    depression    feeling faint or lightheaded, falls    fever    pain in the abdomen, chest, groin, or leg    problems with balance, talking, walking    unusually weak or tired    yellowing of the eyes or skin  Side effects that usually do not require medical attention (report to your doctor or health care professional if they continue or are bothersome):    acne    fluid retention and swelling    headache    irregular periods, spotting, or absent periods    temporary pain, itching, or skin reaction at site where injected    weight gain  What may interact with this medicine?  Do not take this medicine with any of the following medications:    bosentan  This medicine may also interact with the following medications:    aminoglutethimide    antibiotics or medicines for infections, especially rifampin, rifabutin, rifapentine, and griseofulvin    aprepitant    barbiturate medicines such as phenobarbital or primidone    bexarotene    carbamazepine    medicines for seizures like ethotoin, felbamate, oxcarbazepine, phenytoin, topiramate    modafinil    [place]'s wort  What if I miss a dose?  Try not to miss a dose. You must get an injection once every 3 months to maintain birth control. If you cannot keep an appointment, call and reschedule it. If you wait longer than 13 weeks between Depo-Provera contraceptive injections or longer than 14 weeks between Depo-subQ Provera 104 injections, you could get pregnant. Use another method for birth control if you miss  your appointment. You may also need a pregnancy test before receiving another injection.  Where should I keep my medicine?  This does not apply. The injection will be given to you by a health care professional.  What should I tell my health care provider before I take this medicine?  They need to know if you have any of these conditions:    frequently drink alcohol    asthma    blood vessel disease or a history of a blood clot in the lungs or legs    bone disease such as osteoporosis    breast cancer    diabetes    eating disorder (anorexia nervosa or bulimia)    high blood pressure    HIV infection or AIDS    kidney disease    liver disease    mental depression    migraine    seizures (convulsions)    stroke    tobacco smoker    vaginal bleeding    an unusual or allergic reaction to medroxyprogesterone, other hormones, medicines, foods, dyes, or preservatives    pregnant or trying to get pregnant    breast-feeding  What should I watch for while using this medicine?  This drug does not protect you against HIV infection (AIDS) or other sexually transmitted diseases.  Use of this product may cause you to lose calcium from your bones. Loss of calcium may cause weak bones (osteoporosis). Only use this product for more than 2 years if other forms of birth control are not right for you. The longer you use this product for birth control the more likely you will be at risk for weak bones. Ask your health care professional how you can keep strong bones.  You may have a change in bleeding pattern or irregular periods. Many females stop having periods while taking this drug.  If you have received your injections on time, your chance of being pregnant is very low. If you think you may be pregnant, see your health care professional as soon as possible.  Tell your health care professional if you want to get pregnant within the next year. The effect of this medicine may last a long time after you get your last  "injection.  NOTE:This sheet is a summary. It may not cover all possible information. If you have questions about this medicine, talk to your doctor, pharmacist, or health care provider. Copyright  2017 Elsevier                Follow-ups after your visit        Who to contact     If you have questions or need follow up information about today's clinic visit or your schedule please contact [facility] directly at [phone].  Normal or non-critical lab and imaging results will be communicated to you by MyChart, letter or phone within 4 business days after the clinic has received the results. If you do not hear from us within 7 days, please contact the clinic through [email or web address] or phone. If you have a critical or abnormal lab result, we will notify you by phone as soon as possible.  Submit refill requests through [email or web address] or call your pharmacy and they will forward the refill request to us. Please allow 3 business days for your refill to be completed.          Additional Information About Your Visit        [email or web address] Information     [email or web address] lets you send messages to your doctor, view your test results, renew your prescriptions, schedule appointments and more. To sign up, go to [email or web address] . Click on \"Log in\" on the left side of the screen, which will take you to the Welcome page. Then click on \"Sign up Now\" on the right side of the page.     You will be asked to enter the access code listed below, as well as some personal information. Please follow the directions to create your username and password.     Your access code is: [email or web address]  Expires: 2018 10:08 AM     Your access code will  in 90 days. If you need help or a new code, please call your [place] clinic or [phone].        Care EveryWhere ID     This is your Care EveryWhere ID. This could be used by other organizations to access your [place] medical records  [id number]        Your Vitals Were     Pulse Temperature Height " "Last Period Breastfeeding? BMI (Body Mass Index)    72 98.1  F (36.7  C) (Tympanic) 5' 0.5\" (1.537 m) 03/09/2018 No 19.13 kg/m2       Blood Pressure from Last 3 Encounters:   03/13/18 101/68   12/07/17 117/77   09/18/17 119/88    Weight from Last 3 Encounters:   03/13/18 99 lb 9.6 oz (45.2 kg)   12/07/17 104 lb (47.2 kg)   09/18/17 104 lb (47.2 kg)              Today, you had the following     No orders found for display         Today's Medication Changes          These changes are accurate as of 3/13/18 10:08 AM.  If you have any questions, ask your nurse or doctor.               Start taking these medicines.        Dose/Directions    medroxyPROGESTERone 150 MG/ML injection   Commonly known as:  DEPO-PROVERA   Used for:  Encounter for initial prescription of injectable contraceptive   Started by:  Toy Prado MD        Dose:  150 mg   Inject 1 mL (150 mg) into the muscle every 3 months   Quantity:  1 mL   Refills:  3            Where to get your medicines      Some of these will need a paper prescription and others can be bought over the counter.  Ask your nurse if you have questions.     You don't need a prescription for these medications     medroxyPROGESTERone 150 MG/ML injection                Primary Care Provider Office Phone # Fax #    Shakila Yeager Olvin Reilly, [facility] [phone] [address] [facility] [address]        Equal Access to Services     BINA HUANG AH: Hadii halima ku hadasho Soomaali, waaxda luqadaha, qaybta kaalmada adeegyada, waxay samantha kimball . So [facility] [phone].    ATENCIÓN: Si habla angyañol, tiene a taylor disposición servicios gratuitos de asistencia lingüística. Llame al [phone].    We comply with applicable federal civil rights laws and [place] laws. We do not discriminate on the basis of race, color, national origin, age, disability, sex, sexual orientation, or gender identity.            Thank you!     Thank you for choosing " [facility]  for your care. Our goal is always to provide you with excellent care. Hearing back from our patients is one way we can continue to improve our services. Please take a few minutes to complete the written survey that you may receive in the mail after your visit with us. Thank you!             Your Updated Medication List - Protect others around you: Learn how to safely use, store and throw away your medicines at www.disposemymeds.org.          This list is accurate as of 3/13/18 10:08 AM.  Always use your most recent med list.                   Brand Name Dispense Instructions for use Diagnosis    albuterol 108 (90 BASE) MCG/ACT Inhaler    PROAIR HFA/PROVENTIL HFA/VENTOLIN HFA    1 Inhaler    Inhale 2 puffs into the lungs every 6 hours as needed for shortness of breath / dyspnea    Mild persistent asthma without complication       fluticasone 110 MCG/ACT Inhaler    FLOVENT HFA    1 Inhaler    Inhale 2 puffs into the lungs 2 times daily    Mild persistent asthma without complication       levonorgestrel-ethinyl estradiol 0.1-20 MG-MCG per tablet    AVIANE,ALESSE,LESSINA    84 tablet    Take 1 tablet by mouth daily    Encounter for surveillance of contraceptive pills       medroxyPROGESTERone 150 MG/ML injection    DEPO-PROVERA    1 mL    Inject 1 mL (150 mg) into the muscle every 3 months    Encounter for initial prescription of injectable contraceptive       ranitidine 150 MG tablet    ZANTAC    60 tablet    Take 1 tablet (150 mg) by mouth 2 times daily    Gastroesophageal reflux disease without esophagitis

## 2018-03-13 NOTE — PATIENT INSTRUCTIONS
Thank you for choosing [facility].  You may be receiving a survey in the mail from [facility] regarding your visit today.  Please take a few minutes to complete and return the survey to let us know how we are doing.      If you have questions or concerns, please contact us via [employer] or you can contact your care team at [phone].    Our Clinic hours are:  Monday 6:40 am  to 7:00 pm  Tuesday -Friday 6:40 am to 5:00 pm    The [place] outpatient lab hours are:  Monday - Friday 6:10 am to 4:45 pm  Saturdays 7:00 am to 11:00 am  Appointments are required, call [phone]    If you have clinical questions after hours or would like to schedule an appointment,  call the clinic at [phone].    Medroxyprogesterone injection [Contraceptive]  Brand Names: Depo-Provera, Depo-subQ Provera 104  What is this medicine?  MEDROXYPROGESTERONE (me DROX ee proe SOLO te jey) contraceptive injections prevent pregnancy. They provide effective birth control for 3 months. Depo-subQ Provera 104 is also used for treating pain related to endometriosis.  How should I use this medicine?  Depo-Provera Contraceptive injection is given into a muscle. Depo-subQ Provera 104 injection is given under the skin. These injections are given by a health care professional. You must not be pregnant before getting an injection. The injection is usually given during the first 5 days after the start of a menstrual period or 6 weeks after delivery of a baby.  Talk to your pediatrician regarding the use of this medicine in children. Special care may be needed. These injections have been used in female children who have started having menstrual periods.  What side effects may I notice from receiving this medicine?  Side effects that you should report to your doctor or health care professional as soon as possible:    allergic reactions like skin rash, itching or hives, swelling of the face, lips, or tongue    breast tenderness or  discharge    breathing problems    changes in vision    depression    feeling faint or lightheaded, falls    fever    pain in the abdomen, chest, groin, or leg    problems with balance, talking, walking    unusually weak or tired    yellowing of the eyes or skin  Side effects that usually do not require medical attention (report to your doctor or health care professional if they continue or are bothersome):    acne    fluid retention and swelling    headache    irregular periods, spotting, or absent periods    temporary pain, itching, or skin reaction at site where injected    weight gain  What may interact with this medicine?  Do not take this medicine with any of the following medications:    bosentan  This medicine may also interact with the following medications:    aminoglutethimide    antibiotics or medicines for infections, especially rifampin, rifabutin, rifapentine, and griseofulvin    aprepitant    barbiturate medicines such as phenobarbital or primidone    bexarotene    carbamazepine    medicines for seizures like ethotoin, felbamate, oxcarbazepine, phenytoin, topiramate    modafinil    [employer]'s wort  What if I miss a dose?  Try not to miss a dose. You must get an injection once every 3 months to maintain birth control. If you cannot keep an appointment, call and reschedule it. If you wait longer than 13 weeks between Depo-Provera contraceptive injections or longer than 14 weeks between Depo-subQ Provera 104 injections, you could get pregnant. Use another method for birth control if you miss your appointment. You may also need a pregnancy test before receiving another injection.  Where should I keep my medicine?  This does not apply. The injection will be given to you by a health care professional.  What should I tell my health care provider before I take this medicine?  They need to know if you have any of these conditions:    frequently drink alcohol    asthma    blood vessel disease or a history of a  blood clot in the lungs or legs    bone disease such as osteoporosis    breast cancer    diabetes    eating disorder (anorexia nervosa or bulimia)    high blood pressure    HIV infection or AIDS    kidney disease    liver disease    mental depression    migraine    seizures (convulsions)    stroke    tobacco smoker    vaginal bleeding    an unusual or allergic reaction to medroxyprogesterone, other hormones, medicines, foods, dyes, or preservatives    pregnant or trying to get pregnant    breast-feeding  What should I watch for while using this medicine?  This drug does not protect you against HIV infection (AIDS) or other sexually transmitted diseases.  Use of this product may cause you to lose calcium from your bones. Loss of calcium may cause weak bones (osteoporosis). Only use this product for more than 2 years if other forms of birth control are not right for you. The longer you use this product for birth control the more likely you will be at risk for weak bones. Ask your health care professional how you can keep strong bones.  You may have a change in bleeding pattern or irregular periods. Many females stop having periods while taking this drug.  If you have received your injections on time, your chance of being pregnant is very low. If you think you may be pregnant, see your health care professional as soon as possible.  Tell your health care professional if you want to get pregnant within the next year. The effect of this medicine may last a long time after you get your last injection.  NOTE:This sheet is a summary. It may not cover all possible information. If you have questions about this medicine, talk to your doctor, pharmacist, or health care provider. Copyright  2017 Elsevier

## 2018-03-13 NOTE — PROGRESS NOTES
SUBJECTIVE:   Krista Reddy is a 32 year old female who presents to clinic today for the following health issues:    Chief Complaint   Patient presents with     Contraception     would like to discuss switching from pills to depo injection     Gyn Exam     pap screening due, just finishing up having period , wants to do this a different time with PCP     Concern - patient would like to switch from birth control pills back to depo injection     Krista was on Depo injections for about 9 years and stopped these about 5 years ago and changed to OCPs.  She doesn't recall why she switched.  Thinks either she just wanted to take a break from them or there was a change in insurance coverage.  She tolerated them fine without side effects.  She hasn't had any trouble with the OCP but wishes to switch back to Depo for convenience of not having to take a pill every day.  She gets periods regularly on the OCP and just finished one.      Problem list and histories reviewed & adjusted, as indicated.  Additional history: as documented    Patient Active Problem List   Diagnosis     Mild persistent asthma     CARDIOVASCULAR SCREENING; LDL GOAL LESS THAN 160     Cervical high risk HPV (human papillomavirus) test positive     Past Surgical History:   Procedure Laterality Date     NO HISTORY OF SURGERY         Social History   Substance Use Topics     Smoking status: Former Smoker     Smokeless tobacco: Never Used     Alcohol use Yes      Comment: 3 times per week .      Family History   Problem Relation Age of Onset     Asthma Father      Asthma Son      Psychotic Disorder Son      ADHD     Breast Cancer Maternal Aunt          Current Outpatient Prescriptions   Medication Sig Dispense Refill     medroxyPROGESTERone (DEPO-PROVERA) 150 MG/ML injection Inject 1 mL (150 mg) into the muscle every 3 months 1 mL 3     fluticasone (FLOVENT HFA) 110 MCG/ACT Inhaler Inhale 2 puffs into the lungs 2 times daily 1 Inhaler 11     albuterol  "(PROAIR HFA/PROVENTIL HFA/VENTOLIN HFA) 108 (90 BASE) MCG/ACT Inhaler Inhale 2 puffs into the lungs every 6 hours as needed for shortness of breath / dyspnea 1 Inhaler 5     ranitidine (ZANTAC) 150 MG tablet Take 1 tablet (150 mg) by mouth 2 times daily 60 tablet 3     No Known Allergies    Reviewed and updated as needed this visit by clinical staff  Tobacco  Allergies  Meds  Med Hx  Surg Hx  Fam Hx  Soc Hx      Reviewed and updated as needed this visit by Provider         OBJECTIVE:     /68 (BP Location: Left arm, Patient Position: Chair, Cuff Size: Adult Small)  Pulse 72  Temp 98.1  F (36.7  C) (Tympanic)  Ht 5' 0.5\" (1.537 m)  Wt 99 lb 9.6 oz (45.2 kg)  LMP 03/09/2018  Breastfeeding? No  BMI 19.13 kg/m2  Body mass index is 19.13 kg/(m^2).  GENERAL: healthy, alert and no distress    Diagnostic Test Results:  none     ASSESSMENT/PLAN:       1. Encounter for initial prescription of injectable contraceptive    Krista would like to switch from OCP to Depo injections.  She has been on them in the past and tolerated them without problems.  Side effects reviewed and she would like to proceed with the switch.  She just finished her period, so pregnancy test not needed.  First injection done in clinic today.  Return in 3 months for the next one.     - medroxyPROGESTERone (DEPO-PROVERA) 150 MG/ML injection; Inject 1 mL (150 mg) into the muscle every 3 months  Dispense: 1 mL; Refill: 3  - Medroxyprogesterone inj/1mg (Depo Provera J-Code)  - THER/PROPH/DIAG INJ, SC/IM    She was reminded she is due for Pap and states she will do this with her PCP soon.      Toy Prado MD  [facility]    "

## 2018-03-13 NOTE — NURSING NOTE
"Chief Complaint   Patient presents with     Contraception     would like to discuss switching from pills to depo injection     Gyn Exam     pap screening due, just finishing up having period , wants to do this a different time with PCP       Initial /68 (BP Location: Left arm, Patient Position: Chair, Cuff Size: Adult Small)  Pulse 72  Temp 98.1  F (36.7  C) (Tympanic)  Ht 5' 0.5\" (1.537 m)  Wt 99 lb 9.6 oz (45.2 kg)  LMP 03/09/2018  Breastfeeding? No  BMI 19.13 kg/m2 Estimated body mass index is 19.13 kg/(m^2) as calculated from the following:    Height as of this encounter: 5' 0.5\" (1.537 m).    Weight as of this encounter: 99 lb 9.6 oz (45.2 kg).  Medication Reconciliation: complete    "

## 2018-03-14 ASSESSMENT — ASTHMA QUESTIONNAIRES: ACT_TOTALSCORE: 24

## 2018-03-29 ENCOUNTER — OFFICE VISIT (OUTPATIENT)
Dept: FAMILY MEDICINE | Facility: CLINIC | Age: 33
End: 2018-03-29
Payer: COMMERCIAL

## 2018-03-29 VITALS
HEART RATE: 64 BPM | DIASTOLIC BLOOD PRESSURE: 68 MMHG | BODY MASS INDEX: 19.47 KG/M2 | SYSTOLIC BLOOD PRESSURE: 107 MMHG | WEIGHT: 101.38 LBS | TEMPERATURE: 97.9 F

## 2018-03-29 DIAGNOSIS — Z01.419 ENCOUNTER FOR GYNECOLOGICAL EXAMINATION WITHOUT ABNORMAL FINDING: Primary | ICD-10-CM

## 2018-03-29 PROCEDURE — 87591 N.GONORRHOEAE DNA AMP PROB: CPT | Performed by: NURSE PRACTITIONER

## 2018-03-29 PROCEDURE — G0145 SCR C/V CYTO,THINLAYER,RESCR: HCPCS | Performed by: NURSE PRACTITIONER

## 2018-03-29 PROCEDURE — 87491 CHLMYD TRACH DNA AMP PROBE: CPT | Performed by: NURSE PRACTITIONER

## 2018-03-29 PROCEDURE — 99395 PREV VISIT EST AGE 18-39: CPT | Performed by: NURSE PRACTITIONER

## 2018-03-29 PROCEDURE — G0124 SCREEN C/V THIN LAYER BY MD: HCPCS | Performed by: NURSE PRACTITIONER

## 2018-03-29 PROCEDURE — 87624 HPV HI-RISK TYP POOLED RSLT: CPT | Performed by: NURSE PRACTITIONER

## 2018-03-29 NOTE — PROGRESS NOTES
SUBJECTIVE:   CC: Krista Reddy is an 32 year old woman who presents for preventive health visit.     Healthy Habits:    Do you get at least three servings of calcium containing foods daily (dairy, green leafy vegetables, etc.)? yes    Amount of exercise or daily activities, outside of work: 1-2 day(s) per week    Problems taking medications regularly No    Medication side effects: No    Have you had an eye exam in the past two years? yes    Do you see a dentist twice per year? yes    Do you have sleep apnea, excessive snoring or daytime drowsiness?no        Today's PHQ-2 Score:   PHQ-2 ( 1999 Pf[employer]) 3/13/2018 2/28/2017   Q1: Little interest or pleasure in doing things 0 0   Q2: Feeling down, depressed or hopeless 0 0   PHQ-2 Score 0 0       Abuse: Current or Past(Physical, Sexual or Emotional)- No  Do you feel safe in your environment - Yes    Social History   Substance Use Topics     Smoking status: Former Smoker     Smokeless tobacco: Never Used     Alcohol use Yes      Comment: 3 times per week .        Reviewed orders with patient.  Reviewed health maintenance and updated orders accordingly - Yes          Pertinent mammograms are reviewed under the imaging tab.    History of abnormal Pap smear: yes - see problem list    Reviewed and updated as needed this visit by clinical staff  Allergies  Meds         Reviewed and updated as needed this visit by Provider            ROS:  C: NEGATIVE for fever, chills, change in weight  I: NEGATIVE for worrisome rashes, moles or lesions.   E: NEGATIVE for vision changes or irritation  ENT: NEGATIVE for ear, mouth and throat problems  R: NEGATIVE for significant cough or SOB  B: NEGATIVE for masses, tenderness or discharge  CV: NEGATIVE for chest pain, palpitations or peripheral edema  GI: NEGATIVE for nausea, abdominal pain, heartburn, or change in bowel habits  : NEGATIVE for unusual urinary or vaginal symptoms. Periods are irregular with DEPO.  M: NEGATIVE for  significant arthralgias or myalgia  N: NEGATIVE for weakness, dizziness or paresthesias  P: NEGATIVE for changes in mood or affect    OBJECTIVE:   /68  Pulse 64  Temp 97.9  F (36.6  C) (Tympanic)  Wt 101 lb 6 oz (46 kg)  LMP 03/09/2018  BMI 19.47 kg/m2  EXAM:  GENERAL: healthy, alert and no distress  EYES: Eyes grossly normal to inspection, PERRL and conjunctivae and sclerae normal  HENT: ear canals and TM's normal, nose and mouth without ulcers or lesions  NECK: no adenopathy, no asymmetry, masses, or scars and thyroid normal to palpation  RESP: lungs clear to auscultation - no rales, rhonchi or wheezes  BREAST: normal without masses, tenderness or nipple discharge and no palpable axillary masses or adenopathy  CV: regular rate and rhythm, normal S1 S2, no S3 or S4, no murmur, click or rub, no peripheral edema and peripheral pulses strong  ABDOMEN: soft, nontender, no hepatosplenomegaly, no masses and bowel sounds normal   (female): normal female external genitalia, normal urethral meatus, vaginal mucosa pink, moist, well rugated, and normal cervix/adnexa/uterus without masses or discharge  MS: no gross musculoskeletal defects noted, no edema  SKIN: no suspicious lesions or rashes  NEURO: Normal strength and tone, mentation intact and speech normal  PSYCH: mentation appears normal, affect normal/bright    ASSESSMENT/PLAN:       ICD-10-CM    1. Encounter for gynecological examination without abnormal finding Z01.419 Pap imaged thin layer screen with HPV - recommended age 30 - 65 years (select HPV order below)     HPV High Risk Types DNA Cervical     NEISSERIA GONORRHOEA PCR     CHLAMYDIA TRACHOMATIS PCR     Discussed skin cancer risk with use of tanning beds - encouraged her to stop tanning in a tanning bed.      COUNSELING:   Reviewed preventive health counseling, as reflected in patient instructions         reports that she has quit smoking. She has never used smokeless tobacco.    Estimated body mass  "index is 19.47 kg/(m^2) as calculated from the following:    Height as of 3/13/18: 5' 0.5\" (1.537 m).    Weight as of this encounter: 101 lb 6 oz (46 kg).       The risks, benefits and treatment options of prescribed medications or other treatments have been discussed with the patient. The patient verbalized their understanding and should call or follow up if no improvement or if they develop further problems.    RAVI Salvador [facility]  "

## 2018-03-29 NOTE — NURSING NOTE
"Chief Complaint   Patient presents with     Physical       Initial /68  Pulse 64  Temp 97.9  F (36.6  C) (Tympanic)  Wt 101 lb 6 oz (46 kg)  LMP 03/09/2018  BMI 19.47 kg/m2 Estimated body mass index is 19.47 kg/(m^2) as calculated from the following:    Height as of 3/13/18: 5' 0.5\" (1.537 m).    Weight as of this encounter: 101 lb 6 oz (46 kg).  Medication Reconciliation: complete    Mellisa Jensen CMA    "

## 2018-03-29 NOTE — MR AVS SNAPSHOT
After Visit Summary   3/29/2018    Krista Reddy    MRN: [id number]           Patient Information     Date Of Birth          1985        Visit Information        Provider Department      3/29/2018 9:20 Shakila Mendez APRN [facility]        Today's Diagnoses     Encounter for gynecological examination without abnormal finding    -  1      Care Instructions      Preventive Health Recommendations  Female Ages 26 - 39  Yearly exam:   See your health care provider every year in order to    Review health changes.     Discuss preventive care.      Review your medicines if you your doctor has prescribed any.    Until age 30: Get a Pap test every three years (more often if you have had an abnormal result).    After age 30: Talk to your doctor about whether you should have a Pap test every 3 years or have a Pap test with HPV screening every 5 years.   You do not need a Pap test if your uterus was removed (hysterectomy) and you have not had cancer.  You should be tested each year for STDs (sexually transmitted diseases), if you're at risk.   Talk to your provider about how often to have your cholesterol checked.  If you are at risk for diabetes, you should have a diabetes test (fasting glucose).  Shots: Get a flu shot each year. Get a tetanus shot every 10 years.   Nutrition:     Eat at least 5 servings of fruits and vegetables each day.    Eat whole-grain bread, whole-wheat pasta and brown rice instead of white grains and rice.    Talk to your provider about Calcium and Vitamin D.     Lifestyle    Exercise at least 150 minutes a week (30 minutes a day, 5 days of the week). This will help you control your weight and prevent disease.    Limit alcohol to one drink per day.    No smoking.     Wear sunscreen to prevent skin cancer.    See your dentist every six months for an exam and cleaning.            Follow-ups after your visit        Your next 10 appointments already  "scheduled     May 30, 2018  9:30 AM CDT   Nurse Only with [place] FP/IM CMA/LPN   [facility]    [address]  [facility] [address] [phone]              Who to contact     If you have questions or need follow up information about today's clinic visit or your schedule please contact [facility] directly at [phone].  Normal or non-critical lab and imaging results will be communicated to you by [employer], letter or phone within 4 business days after the clinic has received the results. If you do not hear from us within 7 days, please contact the clinic through [employer] or phone. If you have a critical or abnormal lab result, we will notify you by phone as soon as possible.  Submit refill requests through [employer] or call your pharmacy and they will forward the refill request to us. Please allow 3 business days for your refill to be completed.          Additional Information About Your Visit        [employer] Information     [employer] lets you send messages to your doctor, view your test results, renew your prescriptions, schedule appointments and more. To sign up, go to [email or web address] . Click on \"Log in\" on the left side of the screen, which will take you to the Welcome page. Then click on \"Sign up Now\" on the right side of the page.     You will be asked to enter the access code listed below, as well as some personal information. Please follow the directions to create your username and password.     Your access code is: [email or web address]  Expires: 2018 10:08 AM     Your access code will  in 90 days. If you need help or a new code, please call your [facility] clinic or [phone].        Care EveryWhere ID     This is your Care EveryWhere ID. This could be used by other organizations to access your [facility] medical records  [id number]        Your Vitals Were     Pulse Temperature Last Period BMI (Body Mass Index)          64 97.9  F " (36.6  C) (Tympanic) 03/09/2018 19.47 kg/m2         Blood Pressure from Last 3 Encounters:   03/29/18 107/68   03/13/18 101/68   12/07/17 117/77    Weight from Last 3 Encounters:   03/29/18 101 lb 6 oz (46 kg)   03/13/18 99 lb 9.6 oz (45.2 kg)   12/07/17 104 lb (47.2 kg)              We Performed the Following     CHLAMYDIA TRACHOMATIS PCR     HPV High Risk Types DNA Cervical     NEISSERIA GONORRHOEA PCR     Pap imaged thin layer screen with HPV - recommended age 30 - 65 years (select HPV order below)        Primary Care Provider Office Phone # Fax #    Shakila Yeager RAVI [facility] [phone] [address] [facility] [address]        Equal Access to Services     BINA HUANG : Hadii aad ku hadasho Sodarien, waaxda luqadaha, qaybta kaalmada silas, juanita kimball . So [facility] [phone].    ATENCIÓN: Si habla español, tiene a taylor disposición servicios gratuitos de asistencia lingüística. Adryan al [phone].    We comply with applicable federal civil rights laws and [place] laws. We do not discriminate on the basis of race, color, national origin, age, disability, sex, sexual orientation, or gender identity.            Thank you!     Thank you for choosing [facility]  for your care. Our goal is always to provide you with excellent care. Hearing back from our patients is one way we can continue to improve our services. Please take a few minutes to complete the written survey that you may receive in the mail after your visit with us. Thank you!             Your Updated Medication List - Protect others around you: Learn how to safely use, store and throw away your medicines at www.disposemymeds.org.          This list is accurate as of 3/29/18  9:44 AM.  Always use your most recent med list.                   Brand Name Dispense Instructions for use Diagnosis    albuterol 108 (90 BASE) MCG/ACT Inhaler    PROAIR HFA/PROVENTIL HFA/VENTOLIN HFA     1 Inhaler    Inhale 2 puffs into the lungs every 6 hours as needed for shortness of breath / dyspnea    Mild persistent asthma without complication       fluticasone 110 MCG/ACT Inhaler    FLOVENT HFA    1 Inhaler    Inhale 2 puffs into the lungs 2 times daily    Mild persistent asthma without complication       medroxyPROGESTERone 150 MG/ML injection    DEPO-PROVERA    1 mL    Inject 1 mL (150 mg) into the muscle every 3 months    Encounter for initial prescription of injectable contraceptive       ranitidine 150 MG tablet    ZANTAC    60 tablet    Take 1 tablet (150 mg) by mouth 2 times daily    Gastroesophageal reflux disease without esophagitis

## 2018-04-04 LAB
COPATH REPORT: ABNORMAL
PAP: ABNORMAL

## 2018-04-05 LAB
FINAL DIAGNOSIS: ABNORMAL
HPV HR 12 DNA CVX QL NAA+PROBE: POSITIVE
HPV16 DNA SPEC QL NAA+PROBE: POSITIVE
HPV18 DNA SPEC QL NAA+PROBE: NEGATIVE
SPECIMEN DESCRIPTION: ABNORMAL
SPECIMEN SOURCE CVX/VAG CYTO: ABNORMAL

## 2018-04-24 ENCOUNTER — OFFICE VISIT (OUTPATIENT)
Dept: OBGYN | Facility: CLINIC | Age: 33
End: 2018-04-24
Payer: COMMERCIAL

## 2018-04-24 VITALS
BODY MASS INDEX: 19.98 KG/M2 | SYSTOLIC BLOOD PRESSURE: 106 MMHG | TEMPERATURE: 98.6 F | WEIGHT: 104 LBS | DIASTOLIC BLOOD PRESSURE: 79 MMHG | HEART RATE: 82 BPM

## 2018-04-24 DIAGNOSIS — R87.610 PAPANICOLAOU SMEAR OF CERVIX WITH ATYPICAL SQUAMOUS CELLS OF UNDETERMINED SIGNIFICANCE (ASC-US): Primary | ICD-10-CM

## 2018-04-24 DIAGNOSIS — R87.810 CERVICAL HIGH RISK HPV (HUMAN PAPILLOMAVIRUS) TEST POSITIVE: ICD-10-CM

## 2018-04-24 LAB — HCG UR QL: NEGATIVE

## 2018-04-24 PROCEDURE — 88305 TISSUE EXAM BY PATHOLOGIST: CPT | Performed by: OBSTETRICS & GYNECOLOGY

## 2018-04-24 PROCEDURE — 81025 URINE PREGNANCY TEST: CPT | Performed by: OBSTETRICS & GYNECOLOGY

## 2018-04-24 PROCEDURE — 57454 BX/CURETT OF CERVIX W/SCOPE: CPT | Performed by: OBSTETRICS & GYNECOLOGY

## 2018-04-24 RX ORDER — CETIRIZINE HYDROCHLORIDE 10 MG/1
TABLET ORAL DAILY PRN
COMMUNITY
End: 2024-08-09

## 2018-04-24 NOTE — NURSING NOTE
"Initial /79 (BP Location: Left arm, Patient Position: Chair, Cuff Size: Adult Regular)  Pulse 82  Temp 98.6  F (37  C) (Tympanic)  Wt 104 lb (47.2 kg)  Breastfeeding? No  BMI 19.98 kg/m2 Estimated body mass index is 19.98 kg/(m^2) as calculated from the following:    Height as of 3/13/18: 5' 0.5\" (1.537 m).    Weight as of this encounter: 104 lb (47.2 kg). .    Cecilia Venegas    "

## 2018-04-24 NOTE — MR AVS SNAPSHOT
"              After Visit Summary   4/24/2018    Krista Reddy    MRN: [id number]           Patient Information     Date Of Birth          1985        Visit Information        Provider Department      4/24/2018 11:00 AM Fady Ramos MD; [facility] 1 [facility]        Today's Diagnoses     Papanicolaou smear of cervix with atypical squamous cells of undetermined significance (ASC-US)    -  1    Cervical high risk HPV (human papillomavirus) test positive           Follow-ups after your visit        Your next 10 appointments already scheduled     May 30, 2018  9:30 AM CDT   Nurse Only with [facility] SM[facility] FP/IM CMA/LPN   [facility]    [address] [facility] [address] [phone]              Who to contact     If you have questions or need follow up information about today's clinic visit or your schedule please contact [facility] directly at [phone].  Normal or non-critical lab and imaging results will be communicated to you by [employer] Services[employer], letter or phone within 4 business days after the clinic has received the results. If you do not hear from us within 7 days, please contact the clinic through [employer] or phone. If you have a critical or abnormal lab result, we will notify you by phone as soon as possible.  Submit refill requests through [facility] or call your pharmacy and they will forward the refill request to us. Please allow 3 business days for your refill to be completed.          Additional Information About Your Visit        [employer] Information     [facility] lets you send messages to your doctor, view your test results, renew your prescriptions, schedule appointments and more. To sign up, go to [email or web address] . Click on \"Log in\" on the left side of the screen, which will take you to the Welcome page. Then click on \"Sign up Now\" on the right side of the page.     You will be asked to enter the access code " listed below, as well as some personal information. Please follow the directions to create your username and password.     Your access code is: [email or web address]  Expires: 2018 10:08 AM     Your access code will  in 90 days. If you need help or a new code, please call your [facility] clinic or [phone].        Care EveryWhere ID     This is your Care EveryWhere ID. This could be used by other organizations to access your [facility] medical records  [id number]        Your Vitals Were     Pulse Temperature Breastfeeding? BMI (Body Mass Index)          82 98.6  F (37  C) (Tympanic) No 19.98 kg/m2         Blood Pressure from Last 3 Encounters:   18 106/79   18 107/68   18 101/68    Weight from Last 3 Encounters:   18 104 lb (47.2 kg)   18 101 lb 6 oz (46 kg)   18 99 lb 9.6 oz (45.2 kg)              We Performed the Following     COLP CERVIX/UPPER VAGINA W BX CERVIX/ENDOCERV CURETT     HCG Qual, Urine - [facility]  [facility]  ([id number])     Surgical pathology exam        Primary Care Provider Office Phone # Fax #    Shakila Yeager RAVI [facility] [phone] [address] [facility] [address]        Equal Access to Services     BINA HUANG : Hadii halima ku hadasho Soomaali, waaxda luqadaha, qaybta kaalmada brijeshyajoel, juanita kimball . So [facility] [phone].    ATENCIÓN: Si habla español, tiene a taylor disposición servicios gratuitos de asistencia lingüística. Adryan al [phone].    We comply with applicable federal civil rights laws and [place] laws. We do not discriminate on the basis of race, color, national origin, age, disability, sex, sexual orientation, or gender identity.            Thank you!     Thank you for choosing [facility]  for your care. Our goal is always to provide you with excellent care. Hearing back from our patients is one way we can continue to improve our services. Please take a few  minutes to complete the written survey that you may receive in the mail after your visit with us. Thank you!             Your Updated Medication List - Protect others around you: Learn how to safely use, store and throw away your medicines at www.disposemymeds.org.          This list is accurate as of 4/24/18 11:16 AM.  Always use your most recent med list.                   Brand Name Dispense Instructions for use Diagnosis    albuterol 108 (90 Base) MCG/ACT Inhaler    PROAIR HFA/PROVENTIL HFA/VENTOLIN HFA    1 Inhaler    Inhale 2 puffs into the lungs every 6 hours as needed for shortness of breath / dyspnea    Mild persistent asthma without complication       cetirizine 10 MG tablet    zyrTEC     Take by mouth daily        fluticasone 110 MCG/ACT Inhaler    FLOVENT HFA    1 Inhaler    Inhale 2 puffs into the lungs 2 times daily    Mild persistent asthma without complication       medroxyPROGESTERone 150 MG/ML injection    DEPO-PROVERA    1 mL    Inject 1 mL (150 mg) into the muscle every 3 months    Encounter for initial prescription of injectable contraceptive       ranitidine 150 MG tablet    ZANTAC    60 tablet    Take 1 tablet (150 mg) by mouth 2 times daily    Gastroesophageal reflux disease without esophagitis

## 2018-04-24 NOTE — PROGRESS NOTES
Krista Reddy is a 32 year old female   who presents for initial colposcopy.  Referred by Shakila RODRIGUES CNP.  No complaints currently.     Pap smear hx is as follows:   5/22/14: ASCUS Pap, Neg HPV.  2/28/17: NIL Pap, + HR HPV (Neg 16/18). Plan cotest in 1 year.   3/29/18 ASCUS Pap, + HR HPV 16 & other HR HPV (not 18). Plan colp    No LMP recorded.   UPT today is negative      Patient does not smoke    Type of contraception: Depo-Provera  Age at first sexual intercourse: 15  Number of sexual partners (lifetime): 14  Past GYN history:  Chlamydia x 2  Prior cervical/vaginal disease: Normal exam without visible pathology.  Prior cervical treatment: no treatment.      PROCEDURE:  Before the procedure, it was ensured that the patient was educated regarding the nature of her findings to date, the implications, and what was to be done. She has been made aware of the role of HPV, the natural history of infection, ways to minimize her future risk, the effect of HPV on the cervix, and treatment options available should they be indicated.  The details of the colposcopic procedure were reviewed.  All questions were answered before proceeding, and informed consent was therefore obtained.    Speculum placed in vagina and excellent visualization of cervix   acheived, cervix swabbed x 3 with acetic acid solution.    FINDINGS:  Cervix: acetowhitening noted at 11:00 and 7:00-8:00; biopsies taken (2)  Please refer to images section for details.    Pap repeated?:  No  SCJ seen?:  yes    ECC done?:  No  Lugol's solution used?:  Yes   Satisfactory examination?:  yes    ASSESSMENT: ASCUS with positive HPV type 16 and others.    PLAN: specimens labelled and sent to Pathology, will base further treatment on Pathology findings, post biopsy instructions given to patient and call to discuss Pathology results    Fady Ramos MD  [facility]

## 2018-04-27 LAB — COPATH REPORT: NORMAL

## 2018-04-30 NOTE — PROGRESS NOTES
Inform patient that her colposcopy returned negative.   She is to return in 1 year for cotesting Pap smear.     Fady Ramos MD  [facility]

## 2018-05-30 ENCOUNTER — ALLIED HEALTH/NURSE VISIT (OUTPATIENT)
Dept: FAMILY MEDICINE | Facility: CLINIC | Age: 33
End: 2018-05-30
Payer: COMMERCIAL

## 2018-05-30 VITALS
SYSTOLIC BLOOD PRESSURE: 118 MMHG | BODY MASS INDEX: 19.98 KG/M2 | HEART RATE: 76 BPM | WEIGHT: 104 LBS | DIASTOLIC BLOOD PRESSURE: 80 MMHG

## 2018-05-30 PROCEDURE — 96372 THER/PROPH/DIAG INJ SC/IM: CPT

## 2018-05-30 PROCEDURE — 99207 ZZC NO CHARGE NURSE ONLY: CPT

## 2018-07-30 ENCOUNTER — TELEPHONE (OUTPATIENT)
Dept: FAMILY MEDICINE | Facility: CLINIC | Age: 33
End: 2018-07-30

## 2018-07-30 NOTE — TELEPHONE ENCOUNTER
Reason for Call:  Other birth control     Detailed comments: currently taking the depo and would to switch to pills, please advise if she needs an appointment to do this. The insurance covers the pills and the shot has a cost.     Phone Number Patient can be reached at: Home number on file [phone] (home)    Best Time: any     Can we leave a detailed message on this number? YES    Call taken on 7/30/2018 at 10:19 AM by Cuca Madsen

## 2018-08-03 NOTE — TELEPHONE ENCOUNTER
Shakila    Lab provided this RN with a positive chlamydia result for this RN.  Please review labs and advise. Lorene EMERY RN     Subjective
Subjective:
Pt did well overnight and had no acute events. Reports sleeping comfortably 
without chest pain, palpitations, diaphoresis, nausea, dyspnea. Also denies 
abdominal pain, leg pain, difficulty urinating or passing BM. He ate well 
yesterday and has been NPO since midnight.
 
Objective
Objective:
 Vital Signs
 
 
 Result Date Time
 
Pulse Ox 95 08/03 0651
 
B/P 116/72 08/03 0651
 
O2 Delivery Room Air 08/03 0651
 
Temp 97.9 08/03 0651
 
Pulse 52 08/03 0651
 
Resp 18 08/03 0651
 
Tele: SB with rate 40-50s, max 80bpm
 
PE: 
Gen - NAD, AOx4
CV - evan, reg rhythm, S1 and S2 crisp, no MRG
Pulm - CTA BL 
 
 
Results
Results:
 Intake & Output
 
 
 08/03 1600 08/03 0800 08/03 0000
 
Intake Total  120 120
 
Output Total   
 
Balance  120 120
 
    
 
Intake, Oral  120 120
 
 
 Laboratory Tests
 
 
 08/03 08/02 08/02 0608 2015 1342
 
Chemistry   
 
  Sodium (137 - 145 mmol/L) 138  
 
  Potassium (3.5 - 5.1 mmol/L) 4.3  
 
  Chloride (98 - 107 mmol/L) 103  
 
  Carbon Dioxide (22 - 30 mmol/L) 28  
 
  Anion Gap (5 - 16) 7  
 
  BUN (9 - 20 mg/dL) 15  
 
  Creatinine (0.7 - 1.2 mg/dL) 0.7  
 
  Estimated GFR (>60 ml/min) > 60  
 
  BUN/Creatinine Ratio (7 - 25 %) 21.4  
 
  Troponin I (<0.11 ng/ml)  < 0.01 < 0.01
 
Hematology   
 
  CBC w Diff NO MAN DIFF REQ  
 
  WBC (4.8 - 10.8 /CUMM) 7.8  
 
  RBC (4.70 - 6.10 /CUMM) 4.33  L  
 
  Hgb (14.0 - 18.0 G/DL) 12.0  L  
 
  Hct (42 - 52 %) 36.2  L  
 
  MCV (80.0 - 94.0 FL) 83.7  
 
  MCH (27.0 - 31.0 PG) 27.8  
 
  MCHC (33.0 - 37.0 G/DL) 33.2  
 
  RDW (11.5 - 14.5 %) 15.2  H  
 
  Plt Count (130 - 400 /CUMM) 306  
 
  MPV (7.4 - 10.4 FL) 7.8  
 
  Gran % (42.2 - 75.2 %) 50.1  
 
  Lymphocytes % (20.5 - 51.1 %) 41.0  
 
  Monocytes % (1.7 - 9.3 %) 5.2  
 
  Eosinophils % (0 - 5 %) 3.0  
 
  Basophils % (0.0 - 2.0 %) 0.7  
 
  Absolute Granulocytes (1.4 - 6.5 /CUMM) 3.9  
 
  Absolute Lymphocytes (1.2 - 3.4 /CUMM) 3.2  
 
  Absolute Monocytes (0.10 - 0.60 /CUMM) 0.4  
 
  Absolute Eosinophils (0.0 - 0.7 /CUMM) 0.2  
 
  Absolute Basophils (0.0 - 0.2 /CUMM) 0.1  
 
 
 
Assessment/Plan
Assessment:
Assessment: 
Pt is a 39YOM w/ PMH of GIOVANNY on CPAP and on suboxone for opioid dependence after 
R knee surgery for past 11yrs presented to the ED yesterday with 20 minutes of 
chest tightness concerning for angina or arrhythmia. Pt was admitted to [facility] as 
he has been having these episodes for 1 mo and has been evaluated 2x here for 
these sx and has had a Holter by PCP showing Vtach and SVT. He was placed on 
metoprolol and ASA 4 days ago. ECHO from 7/31/18 shows EF of 60% with no 
abnormalities. Trops were neg. EKG changes are apparent from previous EKG on 7/
31/18, showing sinus pauses. Repeat EKG at 1pm shows NSR, intervals WNL, and no 
STT change consistent with EKG from 7/31/18. Final EKG consistent with NSR.
 
Problem List/Plan: 
1. Vtach 
Etiology of Vtach is unclear at this time but Ddx includes QTc prolongation d/t 
suboxone, ischemia, congenital heart abnormality.
* Metoprolol held this AM for stress test. Await cardio rec for restart. 
Continue ASA.
* F/u nuclear stress test today.
* F/u cardio consult.
 
2. GIOVANNY
Pt on CPAP at night. 21qvC1C.
 
3. Opioid Use Disorder 
Pt is stable at this time on suboxone but there is concern as suboxone includes 
buprenorphine which can cause QTc prolongation. 
* At this time we will continue suboxone 8mg/2mg sublingual tablet BID. 
* Await cardio recommendation for need to taper suboxone as this would involve 
much coordination with patient's outpatient provider.
 
Diet: NPO for procedure, then full
DVT Proph: Hep SC q8hrs
Code: Full

## 2018-08-27 ENCOUNTER — ALLIED HEALTH/NURSE VISIT (OUTPATIENT)
Dept: FAMILY MEDICINE | Facility: CLINIC | Age: 33
End: 2018-08-27
Payer: COMMERCIAL

## 2018-08-27 VITALS — SYSTOLIC BLOOD PRESSURE: 130 MMHG | DIASTOLIC BLOOD PRESSURE: 70 MMHG

## 2018-08-27 PROCEDURE — 96372 THER/PROPH/DIAG INJ SC/IM: CPT

## 2018-08-27 PROCEDURE — 99207 ZZC NO CHARGE NURSE ONLY: CPT

## 2018-08-27 NOTE — NURSING NOTE
"Initial /70 Estimated body mass index is 19.98 kg/(m^2) as calculated from the following:    Height as of 3/13/18: 5' 0.5\" (1.537 m).    Weight as of 5/30/18: 104 lb (47.2 kg). .    Pepper Mejia    "

## 2018-08-27 NOTE — MR AVS SNAPSHOT
"              After Visit Summary   2018    Krista Reddy    MRN: [id number]           Patient Information     Date Of Birth          1985        Visit Information        Provider Department      2018 8:45 AM [place] FP/IM CMA/LPN [facility]        Today's Diagnoses     Contraception    -  1       Follow-ups after your visit        Who to contact     If you have questions or need follow up information about today's clinic visit or your schedule please contact [facility] directly at [phone].  Normal or non-critical lab and imaging results will be communicated to you by MyChart, letter or phone within 4 business days after the clinic has received the results. If you do not hear from us within 7 days, please contact the clinic through [employer] or phone. If you have a critical or abnormal lab result, we will notify you by phone as soon as possible.  Submit refill requests through [employer] or call your pharmacy and they will forward the refill request to us. Please allow 3 business days for your refill to be completed.          Additional Information About Your Visit        MyChart Information     TradeBlock lets you send messages to your doctor, view your test results, renew your prescriptions, schedule appointments and more. To sign up, go to [email or web address] . Click on \"Log in\" on the left side of the screen, which will take you to the Welcome page. Then click on \"Sign up Now\" on the right side of the page.     You will be asked to enter the access code listed below, as well as some personal information. Please follow the directions to create your username and password.     Your access code is: [email or web address]  Expires: 2018  8:57 AM     Your access code will  in 90 days. If you need help or a new code, please call your [facility] or [phone].        Care EveryWhere ID     This is your Care EveryWhere ID. This could be used by other organizations to " access your [place] medical records  [id number]         Blood Pressure from Last 3 Encounters:   08/27/18 130/70   05/30/18 118/80   04/24/18 106/79    Weight from Last 3 Encounters:   05/30/18 104 lb (47.2 kg)   04/24/18 104 lb (47.2 kg)   03/29/18 101 lb 6 oz (46 kg)              We Performed the Following     INJECTION INTRAMUSCULAR OR SUB-Q     Medroxyprogesterone inj  1mg   (Depo Provera J-Code)        Primary Care Provider Office Phone # Fax #    Shakila Reilly, APRN [phone] [address] [facility] [address]        Equal Access to Services     BINA HUANG : Hadii halima ku hadasho Sosangeetaali, waaxda luqadaha, qaybta kaalmada adeegyada, juanita echevarriain maryanne kimball . So [facility] [phone].    ATENCIÓN: Si habla español, tiene a taylor disposición servicios gratuitos de asistencia lingüística. Llame al [phone].    We comply with applicable federal civil rights laws and [place] laws. We do not discriminate on the basis of race, color, national origin, age, disability, sex, sexual orientation, or gender identity.            Thank you!     Thank you for choosing [facility]  for your care. Our goal is always to provide you with excellent care. Hearing back from our patients is one way we can continue to improve our services. Please take a few minutes to complete the written survey that you may receive in the mail after your visit with us. Thank you!             Your Updated Medication List - Protect others around you: Learn how to safely use, store and throw away your medicines at www.disposemymeds.org.          This list is accurate as of 8/27/18 11:59 PM.  Always use your most recent med list.                   Brand Name Dispense Instructions for use Diagnosis    albuterol 108 (90 Base) MCG/ACT inhaler    PROAIR HFA/PROVENTIL HFA/VENTOLIN HFA    1 Inhaler    Inhale 2 puffs into the lungs every 6 hours as needed for shortness of breath /  dyspnea    Mild persistent asthma without complication       cetirizine 10 MG tablet    zyrTEC     Take by mouth daily        fluticasone 110 MCG/ACT Inhaler    FLOVENT HFA    1 Inhaler    Inhale 2 puffs into the lungs 2 times daily    Mild persistent asthma without complication       medroxyPROGESTERone 150 MG/ML injection    DEPO-PROVERA    1 mL    Inject 1 mL (150 mg) into the muscle every 3 months    Encounter for initial prescription of injectable contraceptive       ranitidine 150 MG tablet    ZANTAC    60 tablet    Take 1 tablet (150 mg) by mouth 2 times daily    Gastroesophageal reflux disease without esophagitis

## 2018-12-17 ENCOUNTER — OFFICE VISIT (OUTPATIENT)
Dept: FAMILY MEDICINE | Facility: CLINIC | Age: 33
End: 2018-12-17
Payer: COMMERCIAL

## 2018-12-17 VITALS
HEART RATE: 68 BPM | TEMPERATURE: 98.7 F | WEIGHT: 110.6 LBS | DIASTOLIC BLOOD PRESSURE: 74 MMHG | RESPIRATION RATE: 20 BRPM | BODY MASS INDEX: 20.88 KG/M2 | OXYGEN SATURATION: 98 % | HEIGHT: 61 IN | SYSTOLIC BLOOD PRESSURE: 106 MMHG

## 2018-12-17 DIAGNOSIS — J01.90 ACUTE NON-RECURRENT SINUSITIS, UNSPECIFIED LOCATION: Primary | ICD-10-CM

## 2018-12-17 DIAGNOSIS — K21.9 GASTROESOPHAGEAL REFLUX DISEASE WITHOUT ESOPHAGITIS: ICD-10-CM

## 2018-12-17 PROCEDURE — 99213 OFFICE O/P EST LOW 20 MIN: CPT | Performed by: NURSE PRACTITIONER

## 2018-12-17 RX ORDER — CETIRIZINE HYDROCHLORIDE, PSEUDOEPHEDRINE HYDROCHLORIDE 5; 120 MG/1; MG/1
1 TABLET, FILM COATED, EXTENDED RELEASE ORAL 2 TIMES DAILY
Qty: 20 TABLET | Refills: 0 | Status: SHIPPED | OUTPATIENT
Start: 2018-12-17 | End: 2018-12-27

## 2018-12-17 ASSESSMENT — MIFFLIN-ST. JEOR: SCORE: 1136.12

## 2018-12-17 NOTE — PROGRESS NOTES
SUBJECTIVE:   Krista Reddy is a 33 year old female who presents to clinic today for the following health issues:      ENT Symptoms             Symptoms: cc Present Absent Comment   Fever/Chills   x    Fatigue  x     Muscle Aches   x    Eye Irritation  x     Sneezing x x     Nasal John/Drg x x     Sinus Pressure/Pain x x     Loss of smell  x     Dental pain   x    Sore Throat  x  Improving    Swollen Glands   x    Ear Pain/Fullness   x    Cough x x  Nonproductive, dry tight cough   Wheeze x x     Chest Pain   x    Shortness of breath   x    Rash   x    Other    diarrhea     Symptom duration:  4 days    Symptom severity:  moderate    Treatments tried:  inhalers, benadryl, tylenol, cough drops, chloraseptic spray   Contacts:  works in a long term care facility-ill residents, 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  Additional history: as documented    Patient Active Problem List   Diagnosis     Mild persistent asthma     CARDIOVASCULAR SCREENING; LDL GOAL LESS THAN 160     Cervical high risk HPV (human papillomavirus) test positive     Past Surgical History:   Procedure Laterality Date     NO HISTORY OF SURGERY         Social History     Tobacco Use     Smoking status: Former Smoker     Smokeless tobacco: Never Used   Substance Use Topics     Alcohol use: Yes     Comment: 5 times per week .      Family History   Problem Relation Age of Onset     Asthma Father      Asthma Son      Psychotic Disorder Son         ADHD     Breast Cancer Maternal Aunt            Reviewed and updated as needed this visit by clinical staff       Reviewed and updated as needed this visit by Provider         ROS:  Constitutional, HEENT, cardiovascular, pulmonary, gi and gu systems are negative, except as otherwise noted.    OBJECTIVE:     /74 (BP Location: Right arm, Patient Position: Sitting, Cuff Size: Adult Regular)   Pulse 68   Temp 98.7  F (37.1  C) (Tympanic)   " Resp 20   Ht 1.537 m (5' 0.5\")   Wt 50.2 kg (110 lb 9.6 oz)   SpO2 98%   BMI 21.24 kg/m    Body mass index is 21.24 kg/m .  GENERAL: healthy, alert and no distress  EYES: Eyes grossly normal to inspection, PERRL and conjunctivae and sclerae normal  HENT: ear canals and TM's normal, nose and mouth without ulcers or lesions  NECK: no adenopathy, no asymmetry, masses, or scars and thyroid normal to palpation  RESP: lungs clear to auscultation - no rales, rhonchi or wheezes  CV: regular rates and rhythm, normal S1 S2, no S3 or S4 and no murmur, click or rub  SKIN: no suspicious lesions or rashes  NEURO: Normal strength and tone, mentation intact and speech normal  PSYCH: mentation appears normal, affect normal/bright    Diagnostic Test Results:  none     ASSESSMENT/PLAN:       ICD-10-CM    1. Acute non-recurrent sinusitis, unspecified location J01.90 cetirizine-pseudoePHEDrine ER (ZYRTEC-D) 5-120 MG 12 hr tablet   2. Gastroesophageal reflux disease without esophagitis K21.9 ranitidine (ZANTAC) 150 MG tablet       FUTURE APPOINTMENTS:       - RETURN TO CLINIC for new or worsening symptoms. Symptomatic cares. If still having persistent symptoms in 1 week may call me for antibiotic. Augmentin 175-125 mg PO BID x 10 days.     Patient Instructions     Patient Education     Sinusitis (No Antibiotics)    The sinuses are air-filled spaces within the bones of the face. They connect to the inside of the nose. Sinusitis is an inflammation of the tissue that lines the sinuses. Sinusitis can occur during a cold. It can also happen due to allergies to pollens and other particles in the air. It can cause symptoms such as sinus congestion, headache, sore throat, facial swelling, and a feeling of fullness. It may also cause a low-grade fever. Your sinusitis does not include an infection with bacteria. Because of this, antibiotics are not used to treat this problem.  Home care    Drink plenty of water, hot tea, and other liquids. " This may help thin nasal mucus. It also may help your sinuses drain fluids.    Heat may help soothe painful areas of your face. Use a towel soaked in hot water. Or,  the shower and direct the warm spray onto your face. Using a vaporizer along with a menthol rub at night may also help soothe symptoms.     An expectorant with guaifenesin may help thin nasal mucus and help your sinuses drain fluids.    You can use an over-the-counter decongestant, unless a similar medicine was prescribed to you. Nasal sprays work the fastest. Use one that contains phenylephrine or oxymetazoline. First blow your nose gently. Then use the spray. Do not use these medicines more often than directed on the label. If you do, your symptoms may get worse. You may also take pills that contain pseudoephedrine. Don t use products that combine multiple medicines. This is because side effects may be increased. Read all medicine labels. You can also ask the pharmacist for help. (People with high blood pressure should not use decongestants. They can raise blood pressure.)    Over-the-counter antihistamines may help if allergies contributed to your sinusitis.      Use acetaminophen or ibuprofen to control pain, unless another pain medicine was prescribed to you. If you have chronic liver or kidney disease or ever had a stomach ulcer, talk with your healthcare provider before using these medicines. (Aspirin should never be taken by anyone under age 18 who is ill with a fever. It may cause severe liver damage.)    Use nasal rinses or irrigation as instructed by your healthcare provider.    Don't smoke. This can make symptoms worse.  Follow-up care  Follow up with your healthcare provider or our staff if you are NOT better in 1 week.  When to seek medical advice  Call your healthcare provider if any of these occur:    Green or yellow fluid draining from your nose or into your throat    Facial pain or headache that gets worse    Stiff  neck    Unusual drowsiness or confusion    Swelling of your forehead or eyelids    Vision problems, such as blurred or double vision    Fever of 100.4 F (38 C) or higher, or as directed by your healthcare provider    Seizure    Breathing problems    Symptoms that don't go away in 10 days  Date Last Reviewed: 11/1/2017 2000-2018 [employer]. [address] [place] [address]. All rights reserved. This information is not intended as a substitute for professional medical care. Always follow your healthcare professional's instructions.               RAVI Alexandre [facility]

## 2018-12-17 NOTE — LETTER
[facility]  [address] [address]  Phone: [phone]    12/17/18    Krista GAURAV Reddy  [address]  [facility] [place] [address]      To whom it may concern:     Krista was seen today in clinic for an acute illness. Please excuse her from missed work. She may return on Wednesday 12/19/18.    Sincerely,      RAVI Alexandre CNP

## 2018-12-17 NOTE — PATIENT INSTRUCTIONS
Patient Education     Sinusitis (No Antibiotics)    The sinuses are air-filled spaces within the bones of the face. They connect to the inside of the nose. Sinusitis is an inflammation of the tissue that lines the sinuses. Sinusitis can occur during a cold. It can also happen due to allergies to pollens and other particles in the air. It can cause symptoms such as sinus congestion, headache, sore throat, facial swelling, and a feeling of fullness. It may also cause a low-grade fever. Your sinusitis does not include an infection with bacteria. Because of this, antibiotics are not used to treat this problem.  Home care    Drink plenty of water, hot tea, and other liquids. This may help thin nasal mucus. It also may help your sinuses drain fluids.    Heat may help soothe painful areas of your face. Use a towel soaked in hot water. Or,  the shower and direct the warm spray onto your face. Using a vaporizer along with a menthol rub at night may also help soothe symptoms.     An expectorant with guaifenesin may help thin nasal mucus and help your sinuses drain fluids.    You can use an over-the-counter decongestant, unless a similar medicine was prescribed to you. Nasal sprays work the fastest. Use one that contains phenylephrine or oxymetazoline. First blow your nose gently. Then use the spray. Do not use these medicines more often than directed on the label. If you do, your symptoms may get worse. You may also take pills that contain pseudoephedrine. Don t use products that combine multiple medicines. This is because side effects may be increased. Read all medicine labels. You can also ask the pharmacist for help. (People with high blood pressure should not use decongestants. They can raise blood pressure.)    Over-the-counter antihistamines may help if allergies contributed to your sinusitis.      Use acetaminophen or ibuprofen to control pain, unless another pain medicine was prescribed to you. If you have  chronic liver or kidney disease or ever had a stomach ulcer, talk with your healthcare provider before using these medicines. (Aspirin should never be taken by anyone under age 18 who is ill with a fever. It may cause severe liver damage.)    Use nasal rinses or irrigation as instructed by your healthcare provider.    Don't smoke. This can make symptoms worse.  Follow-up care  Follow up with your healthcare provider or our staff if you are NOT better in 1 week.  When to seek medical advice  Call your healthcare provider if any of these occur:    Green or yellow fluid draining from your nose or into your throat    Facial pain or headache that gets worse    Stiff neck    Unusual drowsiness or confusion    Swelling of your forehead or eyelids    Vision problems, such as blurred or double vision    Fever of 100.4 F (38 C) or higher, or as directed by your healthcare provider    Seizure    Breathing problems    Symptoms that don't go away in 10 days  Date Last Reviewed: 11/1/2017 2000-2018 [employer]. [address] [place] [address], [place] [address]. All rights reserved. This information is not intended as a substitute for professional medical care. Always follow your healthcare professional's instructions.

## 2018-12-17 NOTE — LETTER
[facility]  [address] [facility] [address]  Phone: [phone]    12/17/18    Krista GAURAV Phamco  [address]  [facility] [place] [address]      To whom it may concern:     Krista was seen today in clinic for an acute illness. Please excuse her from missed work. She may return when her symptoms have improved. I expect her to improve in the next 1-3 days.     Sincerely,      RAVI Alexandre CNP

## 2019-02-04 ENCOUNTER — OFFICE VISIT (OUTPATIENT)
Dept: FAMILY MEDICINE | Facility: CLINIC | Age: 34
End: 2019-02-04

## 2019-02-04 VITALS
BODY MASS INDEX: 21.85 KG/M2 | TEMPERATURE: 97 F | HEIGHT: 60 IN | RESPIRATION RATE: 18 BRPM | WEIGHT: 111.3 LBS | HEART RATE: 75 BPM | SYSTOLIC BLOOD PRESSURE: 112 MMHG | OXYGEN SATURATION: 99 % | DIASTOLIC BLOOD PRESSURE: 72 MMHG

## 2019-02-04 DIAGNOSIS — L30.9 ECZEMA, UNSPECIFIED TYPE: Primary | ICD-10-CM

## 2019-02-04 PROCEDURE — 99213 OFFICE O/P EST LOW 20 MIN: CPT | Performed by: NURSE PRACTITIONER

## 2019-02-04 RX ORDER — TRIAMCINOLONE ACETONIDE 1 MG/G
OINTMENT TOPICAL 2 TIMES DAILY
Qty: 30 G | Refills: 1 | Status: SHIPPED | OUTPATIENT
Start: 2019-02-04 | End: 2020-01-16

## 2019-02-04 ASSESSMENT — MIFFLIN-ST. JEOR: SCORE: 1131.35

## 2019-02-04 NOTE — NURSING NOTE
Initial /72 (BP Location: Right arm)   Pulse 75   Temp 97  F (36.1  C) (Tympanic)   Resp 18   Ht 1.524 m (5')   Wt 50.5 kg (111 lb 4.8 oz)   SpO2 99%   BMI 21.74 kg/m   Estimated body mass index is 21.74 kg/m  as calculated from the following:    Height as of this encounter: 1.524 m (5').    Weight as of this encounter: 50.5 kg (111 lb 4.8 oz). .    Pepper Mejia

## 2019-02-04 NOTE — PROGRESS NOTES
SUBJECTIVE:   Krista Reddy is a 33 year old female who presents to clinic today for the following health issues:      Rash  Onset: one month    Description:   Location: bilateral feet and inner thighs  Character: round, blotchy, raised, flakey, red  Itching (Pruritis): YES- mild when new spots appear    Progression of Symptoms:  same    Accompanying Signs & Symptoms:  Fever: no   Body aches or joint pain: no   Sore throat symptoms: no   Recent cold symptoms: no     History:   Previous similar rash: no     Precipitating factors:   Exposure to similar rash: YES- changed laundry detergent  New exposures: as noted above   Recent travel: no     Alleviating factors:  OTC fungal cream    Therapies Tried and outcome: OTC fungal cream has helped as well as athletes foot cream            Problem list and histories reviewed & adjusted, as indicated.  Additional history: as documented      Reviewed and updated as needed this visit by clinical staff       Reviewed and updated as needed this visit by Provider         ROS:  Constitutional, HEENT, cardiovascular, pulmonary, gi and gu systems are negative, except as otherwise noted.    OBJECTIVE:     /72 (BP Location: Right arm)   Pulse 75   Temp 97  F (36.1  C) (Tympanic)   Resp 18   Ht 1.524 m (5')   Wt 50.5 kg (111 lb 4.8 oz)   SpO2 99%   BMI 21.74 kg/m    Body mass index is 21.74 kg/m .  GENERAL: healthy, alert and no distress  SKIN: dorsal surface of both feet, and left inner thigh - scaling erythematous patches      ASSESSMENT/PLAN:       ICD-10-CM    1. Eczema, unspecified type L30.9 triamcinolone (KENALOG) 0.1 % external ointment       Patient Instructions   Twice daily, apply triamcinolone ointment to rash. Cover with a layer of Aquaphor ointment. Then put your socks on.    Call if no improvement in one week.        The risks, benefits and treatment options of prescribed medications or other treatments have been discussed with the patient. The patient  verbalized their understanding and should call or follow up if no improvement or if they develop further problems.    RAVI Salvador [facility]

## 2019-02-04 NOTE — PATIENT INSTRUCTIONS
Twice daily, apply triamcinolone ointment to rash. Cover with a layer of Aquaphor ointment. Then put your socks on.    Call if no improvement in one week.          Thank you for choosing [facility].  You may be receiving a survey in the mail from Elly Cruz regarding your visit today.  Please take a few minutes to complete and return the survey to let us know how we are doing.      If you have questions or concerns, please contact us via [employer] or you can contact your care team at [phone].    Our Clinic hours are:  Monday 6:40 am  to 7:00 pm  Tuesday -Friday 6:40 am to 5:00 pm    The [place] outpatient lab hours are:  Monday - Friday 6:10 am to 4:45 pm  Saturdays 7:00 am to 11:00 am  Appointments are required, call [phone]    If you have clinical questions after hours or would like to schedule an appointment,  call the clinic at [phone].

## 2019-02-05 ASSESSMENT — ASTHMA QUESTIONNAIRES: ACT_TOTALSCORE: 22

## 2019-02-21 ENCOUNTER — OFFICE VISIT (OUTPATIENT)
Dept: FAMILY MEDICINE | Facility: CLINIC | Age: 34
End: 2019-02-21

## 2019-02-21 VITALS
TEMPERATURE: 97.6 F | HEIGHT: 60 IN | SYSTOLIC BLOOD PRESSURE: 92 MMHG | RESPIRATION RATE: 16 BRPM | OXYGEN SATURATION: 98 % | HEART RATE: 66 BPM | BODY MASS INDEX: 21.4 KG/M2 | DIASTOLIC BLOOD PRESSURE: 70 MMHG | WEIGHT: 109 LBS

## 2019-02-21 DIAGNOSIS — Z01.419 ENCOUNTER FOR GYNECOLOGICAL EXAMINATION WITHOUT ABNORMAL FINDING: Primary | ICD-10-CM

## 2019-02-21 DIAGNOSIS — Z11.3 SCREEN FOR STD (SEXUALLY TRANSMITTED DISEASE): ICD-10-CM

## 2019-02-21 DIAGNOSIS — Z23 ENCOUNTER FOR ADMINISTRATION OF VACCINE: ICD-10-CM

## 2019-02-21 DIAGNOSIS — Z30.011 ENCOUNTER FOR INITIAL PRESCRIPTION OF CONTRACEPTIVE PILLS: ICD-10-CM

## 2019-02-21 DIAGNOSIS — J45.30 MILD PERSISTENT ASTHMA WITHOUT COMPLICATION: ICD-10-CM

## 2019-02-21 LAB — HCG UR QL: NEGATIVE

## 2019-02-21 PROCEDURE — 81025 URINE PREGNANCY TEST: CPT | Performed by: NURSE PRACTITIONER

## 2019-02-21 PROCEDURE — 90471 IMMUNIZATION ADMIN: CPT | Performed by: NURSE PRACTITIONER

## 2019-02-21 PROCEDURE — 90714 TD VACC NO PRESV 7 YRS+ IM: CPT | Performed by: NURSE PRACTITIONER

## 2019-02-21 PROCEDURE — 87491 CHLMYD TRACH DNA AMP PROBE: CPT | Performed by: NURSE PRACTITIONER

## 2019-02-21 PROCEDURE — 87591 N.GONORRHOEAE DNA AMP PROB: CPT | Performed by: NURSE PRACTITIONER

## 2019-02-21 PROCEDURE — 99395 PREV VISIT EST AGE 18-39: CPT | Performed by: NURSE PRACTITIONER

## 2019-02-21 PROCEDURE — 88175 CYTOPATH C/V AUTO FLUID REDO: CPT | Performed by: NURSE PRACTITIONER

## 2019-02-21 PROCEDURE — 87624 HPV HI-RISK TYP POOLED RSLT: CPT | Performed by: NURSE PRACTITIONER

## 2019-02-21 RX ORDER — FLUTICASONE PROPIONATE 110 UG/1
2 AEROSOL, METERED RESPIRATORY (INHALATION) 2 TIMES DAILY
Qty: 1 INHALER | Refills: 11 | Status: SHIPPED | OUTPATIENT
Start: 2019-02-21 | End: 2020-01-16

## 2019-02-21 RX ORDER — ALBUTEROL SULFATE 90 UG/1
2 AEROSOL, METERED RESPIRATORY (INHALATION) EVERY 6 HOURS PRN
Qty: 1 INHALER | Refills: 5 | Status: SHIPPED | OUTPATIENT
Start: 2019-02-21 | End: 2020-01-16

## 2019-02-21 RX ORDER — LEVONORGESTREL/ETHIN.ESTRADIOL 0.1-0.02MG
1 TABLET ORAL DAILY
Qty: 84 TABLET | Refills: 4 | Status: SHIPPED | OUTPATIENT
Start: 2019-02-21 | End: 2020-01-16

## 2019-02-21 ASSESSMENT — MIFFLIN-ST. JEOR: SCORE: 1120.92

## 2019-02-21 NOTE — LETTER
My Asthma Action Plan  Name: Krista Reddy   YOB: 1985  Date: 2/21/2019   My doctor: RAVI Salvador CNP   My clinic: [facility]        My Control Medicine: { :509945}  My Rescue Medicine: { :564350}  {AAP include Oral Steroid:685755} My Asthma Severity: { :408807}  Avoid your asthma triggers: { :766016}        {Is patient a child or adult?:827096}       GREEN ZONE   Good Control    I feel good    No cough or wheeze    Can work, sleep and play without asthma symptoms       Take your asthma control medicine every day.     1. If exercise triggers your asthma, take your rescue medication    15 minutes before exercise or sports, and    During exercise if you have asthma symptoms  2. Spacer to use with inhaler: If you have a spacer, make sure to use it with your inhaler             YELLOW ZONE Getting Worse  I have ANY of these:    I do not feel good    Cough or wheeze    Chest feels tight    Wake up at night   1. Keep taking your Green Zone medications  2. Start taking your rescue medicine:    every 20 minutes for up to 1 hour. Then every 4 hours for 24-48 hours.  3. If you stay in the Yellow Zone for more than 12-24 hours, contact your doctor.  4. If you do not return to the Green Zone in 12-24 hours or you get worse, start taking your oral steroid medicine if prescribed by your provider.           RED ZONE Medical Alert - Get Help  I have ANY of these:    I feel awful    Medicine is not helping    Breathing getting harder    Trouble walking or talking    Nose opens wide to breathe       1. Take your rescue medicine NOW  2. If your provider has prescribed an oral steroid medicine, start taking it NOW  3. Call your doctor NOW  4. If you are still in the Red Zone after 20 minutes and you have not reached your doctor:    Take your rescue medicine again and    Call 911 or go to the emergency room right away    See your regular doctor within 2 weeks of an Emergency Room or Urgent  Care visit for follow-up treatment.          Annual Reminders:  Meet with Asthma Educator,  Flu Shot in the Fall, consider Pneumonia Vaccination for patients with asthma (aged 19 and older).    Pharmacy: [facility] [place], [place] - [facility]                      Asthma Triggers  How To Control Things That Make Your Asthma Worse    Triggers are things that make your asthma worse.  Look at the list below to help you find your triggers and what you can do about them.  You can help prevent asthma flare-ups by staying away from your triggers.      Trigger                                                          What you can do   Cigarette Smoke  Tobacco smoke can make asthma worse. Do not allow smoking in your home, car or around you.  Be sure no one smokes at a child s day care or school.  If you smoke, ask your health care provider for ways to help you quit.  Ask family members to quit too.  Ask your health care provider for a referral to Quit Plan to help you quit smoking, or call 8-169-692-PLAN.     Colds, Flu, Bronchitis  These are common triggers of asthma. Wash your hands often.  Don t touch your eyes, nose or mouth.  Get a flu shot every year.     Dust Mites  These are tiny bugs that live in cloth or carpet. They are too small to see. Wash sheets and blankets in hot water every week.   Encase pillows and mattress in dust mite proof covers.  Avoid having carpet if you can. If you have carpet, vacuum weekly.   Use a dust mask and HEPA vacuum.   Pollen and Outdoor Mold  Some people are allergic to trees, grass, or weed pollen, or molds. Try to keep your windows closed.  Limit time out doors when pollen count is high.   Ask you health care provider about taking medicine during allergy season.     Animal Dander  Some people are allergic to skin flakes, urine or saliva from pets with fur or feathers. Keep pets with fur or feathers out of your home.    If you can t keep the pet outdoors, then keep  the pet out of your bedroom.  Keep the bedroom door closed.  Keep pets off cloth furniture and away from stuffed toys.     Mice, Rats, and Cockroaches  Some people are allergic to the waste from these pests.   Cover food and garbage.  Clean up spills and food crumbs.  Store grease in the refrigerator.   Keep food out of the bedroom.   Indoor Mold  This can be a trigger if your home has high moisture. Fix leaking faucets, pipes, or other sources of water.   Clean moldy surfaces.  Dehumidify basement if it is damp and smelly.   Smoke, Strong Odors, and Sprays  These can reduce air quality. Stay away from strong odors and sprays, such as perfume, powder, hair spray, paints, smoke incense, paint, cleaning products, candles and new carpet.   Exercise or Sports  Some people with asthma have this trigger. Be active!  Ask your doctor about taking medicine before sports or exercise to prevent symptoms.    Warm up for 5-10 minutes before and after sports or exercise.     Other Triggers of Asthma  Cold air:  Cover your nose and mouth with a scarf.  Sometimes laughing or crying can be a trigger.  Some medicines and food can trigger asthma.

## 2019-02-21 NOTE — PATIENT INSTRUCTIONS
How to use your oral contraceptive pills:    Start on Sunday, continue the pills daily through the month and you should have menses on the fourth week.  You should take the pills at around the same time every day, take a missed pill as soon as you remember.  Any missed pills could cause spotting, cramping or pregnancy if sexually active.  While these medications usually make the period lighter and decrease cramping, there is a possibility that you could have heavier or irregular bleeding.  If that is not improving over the first 3 months we can switch to another contraception.  The pill should not be taken if there is a strong family history of blood clots or breast cancer.  You should avoid smoking while on the pill.  Please plan to use condoms to protect against sexually transmitted diseases if you are sexually active.      Return to clinic in one month for a nurse visit for blood pressure check.          Preventive Health Recommendations  Female Ages 26 - 39  Yearly exam:   See your health care provider every year in order to    Review health changes.     Discuss preventive care.      Review your medicines if you your doctor has prescribed any.    Until age 30: Get a Pap test every three years (more often if you have had an abnormal result).    After age 30: Talk to your doctor about whether you should have a Pap test every 3 years or have a Pap test with HPV screening every 5 years.   You do not need a Pap test if your uterus was removed (hysterectomy) and you have not had cancer.  You should be tested each year for STDs (sexually transmitted diseases), if you're at risk.   Talk to your provider about how often to have your cholesterol checked.  If you are at risk for diabetes, you should have a diabetes test (fasting glucose).  Shots: Get a flu shot each year. Get a tetanus shot every 10 years.   Nutrition:     Eat at least 5 servings of fruits and vegetables each day.    Eat whole-grain bread, whole-wheat  pasta and brown rice instead of white grains and rice.    Get adequate Calcium and Vitamin D.     Lifestyle    Exercise at least 150 minutes a week (30 minutes a day, 5 days of the week). This will help you control your weight and prevent disease.    Limit alcohol to one drink per day.    No smoking.     Wear sunscreen to prevent skin cancer.    See your dentist every six months for an exam and cleaning.

## 2019-02-21 NOTE — PROGRESS NOTES
SUBJECTIVE:   CC: Krista Reddy is an 33 year old woman who presents for preventive health visit.     Healthy Habits:    Do you get at least three servings of calcium containing foods daily (dairy, green leafy vegetables, etc.)? no, taking calcium and/or vitamin D supplement: yes -     Amount of exercise or daily activities, outside of work: walking at work    Problems taking medications regularly No    Medication side effects: No    Have you had an eye exam in the past two years? yes    Do you see a dentist twice per year? yes    Do you have sleep apnea, excessive snoring or daytime drowsiness?no      Wants to start birth control pill - wants pill she was previously on.  Std screening-  No symptoms    Today's PHQ-2 Score:   PHQ-2 ( 1999 [employer]) 2/21/2019 2/4/2019   Q1: Little interest or pleasure in doing things 0 0   Q2: Feeling down, depressed or hopeless 0 0   PHQ-2 Score 0 0       Abuse: Current or Past(Physical, Sexual or Emotional)- No  Do you feel safe in your environment? Yes    Social History     Tobacco Use     Smoking status: Former Smoker     Smokeless tobacco: Never Used   Substance Use Topics     Alcohol use: Yes     Comment: 5 times per week .      If you drink alcohol do you typically have >3 drinks per day or >7 drinks per week? Yes - AUDIT SCORE:  12  AUDIT - Alcohol Use Disorders Identification Test - Reproduced from the World Health Organization Audit 2001 (Second Edition) 2/21/2019   1.  How often do you have a drink containing alcohol? 2 to 3 times a week   2.  How many drinks containing alcohol do you have on a typical day when you are drinking? 5 or 6   3.  How often do you have five or more drinks on one occasion? Less than monthly   4.  How often during the last year have you found that you were not able to stop drinking once you had started? Never   5.  How often during the last year have you failed to do what was normally expected of you because of drinking? Less than monthly   6.   How often during the last year have you needed a first drink in the morning to get yourself going after a heavy drinking session? Less than monthly   7.  How often during the last year have you had a feeling of guilt or remorse after drinking? Monthly   8.  How often during the last year have you been unable to remember what happened the night before because of your drinking? Monthly   9.  Have you or someone else been injured because of your drinking? No   10. Has a relative, friend, doctor or other health care worker been concerned about your drinking or suggested you cut down? No   TOTAL SCORE 12                        Reviewed orders with patient.  Reviewed health maintenance and updated orders accordingly - Yes            History of abnormal Pap smear: YES - updated in Problem List and Health Maintenance accordingly  PAP / HPV Latest Ref Rng & Units 3/29/2018 2/28/2017 5/22/2014   PAP - ASC-US(A) NIL ASC-US(A)   HPV 16 DNA NEG:Negative Positive(A) Negative -   HPV 18 DNA NEG:Negative Negative Negative -   OTHER HR HPV NEG:Negative Positive(A) Positive(A) -     Reviewed and updated as needed this visit by clinical staff  Tobacco  Allergies  Meds         Reviewed and updated as needed this visit by Provider               ROS:  CONSTITUTIONAL: NEGATIVE for fever, chills, change in weight  INTEGUMENTARU/SKIN: NEGATIVE for worrisome rashes, moles or lesions  EYES: NEGATIVE for vision changes or irritation  ENT: NEGATIVE for ear, mouth and throat problems  RESP: NEGATIVE for significant cough or SOB  BREAST: NEGATIVE for masses, tenderness or discharge  CV: NEGATIVE for chest pain, palpitations or peripheral edema  GI: NEGATIVE for nausea, abdominal pain, heartburn, or change in bowel habits  : NEGATIVE for unusual urinary or vaginal symptoms. Periods are regular.  MUSCULOSKELETAL: NEGATIVE for significant arthralgias or myalgia  NEURO: NEGATIVE for weakness, dizziness or paresthesias  PSYCHIATRIC: NEGATIVE for  changes in mood or affect    OBJECTIVE:   BP 92/70 (BP Location: Right arm)   Pulse 66   Temp 97.6  F (36.4  C) (Tympanic)   Resp 16   Ht 1.524 m (5')   Wt 49.4 kg (109 lb)   SpO2 98%   Breastfeeding? No   BMI 21.29 kg/m    EXAM:  GENERAL: healthy, alert and no distress  EYES: Eyes grossly normal to inspection, PERRL and conjunctivae and sclerae normal  HENT: ear canals and TM's normal, nose and mouth without ulcers or lesions  NECK: no adenopathy, no asymmetry, masses, or scars and thyroid normal to palpation  RESP: lungs clear to auscultation - no rales, rhonchi or wheezes  BREAST: normal without masses, tenderness or nipple discharge and no palpable axillary masses or adenopathy  CV: regular rate and rhythm, normal S1 S2, no S3 or S4, no murmur, click or rub, no peripheral edema and peripheral pulses strong  ABDOMEN: soft, nontender, no hepatosplenomegaly, no masses and bowel sounds normal   (female): normal female external genitalia, normal urethral meatus, vaginal mucosa pink, moist, well rugated, and normal cervix/adnexa/uterus without masses or discharge  MS: no gross musculoskeletal defects noted, no edema  SKIN: no suspicious lesions or rashes  NEURO: Normal strength and tone, mentation intact and speech normal  PSYCH: mentation appears normal, affect normal/bright    ASSESSMENT/PLAN:       ICD-10-CM    1. Encounter for gynecological examination without abnormal finding Z01.419 HPV High Risk Types DNA Cervical     Pap imaged thin layer diagnostic with HPV (select HPV order below)  Also discussed alcohol use.     2. Mild persistent asthma without complication J45.30 fluticasone (FLOVENT HFA) 110 MCG/ACT inhaler     albuterol (PROAIR HFA/PROVENTIL HFA/VENTOLIN HFA) 108 (90 Base) MCG/ACT inhaler   3. Encounter for initial prescription of contraceptive pills Z30.011 levonorgestrel-ethinyl estradiol (AVIANE/ALESSE/LESSINA) 0.1-20 MG-MCG tablet     CANCELED: Beta HCG Qual, U[facility]  ([id number])   4. Encounter for administration of vaccine Z23 TD (ADULT, 7+) PRESERVE FREE     ADMIN 1st VACCINE   5. Screen for STD (sexually transmitted disease) Z11.3 NEISSERIA GONORRHOEA PCR     CHLAMYDIA TRACHOMATIS PCR         COUNSELING:   Reviewed preventive health counseling, as reflected in patient instructions    BP Readings from Last 1 Encounters:   02/21/19 92/70     Estimated body mass index is 21.29 kg/m  as calculated from the following:    Height as of this encounter: 1.524 m (5').    Weight as of this encounter: 49.4 kg (109 lb).       reports that she has quit smoking. she has never used smokeless tobacco.      The risks, benefits and treatment options of prescribed medications or other treatments have been discussed with the patient. The patient verbalized their understanding and should call or follow up if no improvement or if they develop further problems.    RAVI Salvador [facility]

## 2019-02-25 LAB
COPATH REPORT: NORMAL
PAP: NORMAL

## 2019-02-27 PROBLEM — R87.810 CERVICAL HIGH RISK HPV (HUMAN PAPILLOMAVIRUS) TEST POSITIVE: Status: ACTIVE | Noted: 2017-02-28

## 2019-03-19 ENCOUNTER — OFFICE VISIT (OUTPATIENT)
Dept: OBGYN | Facility: CLINIC | Age: 34
End: 2019-03-19

## 2019-03-19 VITALS
BODY MASS INDEX: 21.4 KG/M2 | HEART RATE: 82 BPM | HEIGHT: 60 IN | SYSTOLIC BLOOD PRESSURE: 111 MMHG | TEMPERATURE: 97.6 F | WEIGHT: 109 LBS | DIASTOLIC BLOOD PRESSURE: 78 MMHG | RESPIRATION RATE: 16 BRPM

## 2019-03-19 DIAGNOSIS — R87.810 CERVICAL HIGH RISK HPV (HUMAN PAPILLOMAVIRUS) TEST POSITIVE: Primary | ICD-10-CM

## 2019-03-19 LAB — HCG UR QL: NEGATIVE

## 2019-03-19 PROCEDURE — 88305 TISSUE EXAM BY PATHOLOGIST: CPT | Performed by: OBSTETRICS & GYNECOLOGY

## 2019-03-19 PROCEDURE — 81025 URINE PREGNANCY TEST: CPT | Performed by: OBSTETRICS & GYNECOLOGY

## 2019-03-19 PROCEDURE — 57454 BX/CURETT OF CERVIX W/SCOPE: CPT | Performed by: OBSTETRICS & GYNECOLOGY

## 2019-03-19 PROCEDURE — 88342 IMHCHEM/IMCYTCHM 1ST ANTB: CPT | Performed by: OBSTETRICS & GYNECOLOGY

## 2019-03-19 ASSESSMENT — MIFFLIN-ST. JEOR: SCORE: 1120.92

## 2019-03-19 NOTE — PROGRESS NOTES
Krista Reddy is a 33 year old female P1 (SVDx1) who presents for repeat colposcopy.    Referred by Shakila Reilly APRN CNP.       Pap smear hx is as follows:   2004 - 2011 NIL Paps (Care Everywhere)  5/22/14: ASCUS Pap, Neg HPV.  2/28/17: NIL Pap, + HR HPV (Neg 16/18). Plan cotest in 1 year.   3/29/18 ASCUS Pap, + HR HPV 16 & other HR HPV (not 18). Plan colp  4/24/18 Ashby Bx and ECC negative. Plan: Co-test in 1 year due by 4/24/19.  2/21/19 NIL Pap, + HR HPV 16 & other (neg 18). Plan colp      No LMP recorded. Patient is not currently having periods (Reason: Irregular Periods).   UPT today is negative      Patient does not smoke    Type of contraception: OCPs  Age at first sexual intercourse: 15  Number of sexual partners (lifetime): 14  Past GYN history:  Chlamydia x 2  Prior cervical/vaginal disease: Normal exam without visible pathology.  Prior cervical treatment: no treatment.    PROCEDURE:  Before the procedure, it was ensured that the patient was educated regarding the nature of her findings to date, the implications, and what was to be done. She has been made aware of the role of HPV, the natural history of infection, ways to minimize her future risk, the effect of HPV on the cervix, and treatment options available should they be indicated.  The details of the colposcopic procedure were reviewed.  All questions were answered before proceeding, and informed consent was therefore obtained.    Speculum placed in vagina and excellent visualization of cervix   acheived, cervix swabbed x 3 with acetic acid solution.    FINDINGS:  Cervix: acetowhitening noted 6-7:00; biopsy taken (1)   Please refer to images section for details.    Pap repeated?:  No  SCJ seen?:  yes    ECC done?:  Yes   Lugol's solution used?:  Yes  Satisfactory examination?:  yes      ASSESSMENT: High risk HPV positive.    PLAN: specimens labelled and sent to Pathology, will base further treatment on Pathology findings, post biopsy  instructions given to patient and call to discuss Pathology results    Fady Ramos MD  [facility]

## 2019-03-21 LAB — COPATH REPORT: NORMAL

## 2019-03-22 NOTE — RESULT ENCOUNTER NOTE
Inform patient that her colposcopy returned benign.   She is to return in 1 year for cotesting Pap smear.     Fady Ramos MD  [facility]

## 2019-05-17 ENCOUNTER — OFFICE VISIT (OUTPATIENT)
Dept: FAMILY MEDICINE | Facility: CLINIC | Age: 34
End: 2019-05-17

## 2019-05-17 VITALS
DIASTOLIC BLOOD PRESSURE: 68 MMHG | RESPIRATION RATE: 16 BRPM | TEMPERATURE: 98.4 F | HEIGHT: 60 IN | WEIGHT: 102 LBS | SYSTOLIC BLOOD PRESSURE: 116 MMHG | OXYGEN SATURATION: 98 % | HEART RATE: 80 BPM | BODY MASS INDEX: 20.03 KG/M2

## 2019-05-17 DIAGNOSIS — H10.32 ACUTE CONJUNCTIVITIS OF LEFT EYE, UNSPECIFIED ACUTE CONJUNCTIVITIS TYPE: Primary | ICD-10-CM

## 2019-05-17 PROCEDURE — 99213 OFFICE O/P EST LOW 20 MIN: CPT | Performed by: NURSE PRACTITIONER

## 2019-05-17 RX ORDER — POLYMYXIN B SULFATE AND TRIMETHOPRIM 1; 10000 MG/ML; [USP'U]/ML
1-2 SOLUTION OPHTHALMIC EVERY 6 HOURS
Qty: 10 ML | Refills: 0 | Status: SHIPPED | OUTPATIENT
Start: 2019-05-17 | End: 2020-01-16

## 2019-05-17 ASSESSMENT — MIFFLIN-ST. JEOR: SCORE: 1089.17

## 2019-05-17 NOTE — PATIENT INSTRUCTIONS
Patient Education     What Is Conjunctivitis?    Conjunctivitis is an irritation or infection. It affects the membrane that covers the white of your eye and the inside of your eyelid (conjunctiva). It can happen to one or both eyes. The membrane swells and the blood vessels enlarge (dilate). This makes your eye red. That's why conjunctivitis is sometimes called red eye or pink eye.  What are the symptoms?  If you have one or more of these symptoms, see an eye healthcare provider:    Redness in and around your eye    Eyes that are puffy and sore    Itching, burning, or stinging eyes    Watery eyes or discharge from your eye    Eyelids that are crusty or stuck together when you wake up in the morning    Pink color in the whites of one or both eyes    Sensitivity to bright light  Getting treatment quickly can help prevent damage to your eyes.  How is it diagnosed?  Conjunctivitis is usually a minor eye infection. But it can sometimes become a more serious problem. Some more serious eye diseases have symptoms that look like conjunctivitis. So it's important for an eye healthcare provider to diagnose you. Your eye healthcare provider will ask about your symptoms and any medicines you take. He or she will ask about any illnesses or medical conditions you may have. The healthcare provider will also check your eyes with a hand-held light and a special microscope called a slit lamp.  Date Last Reviewed: 10/1/2017    0507-7730 [employer]. [address] [place] [address], [place] [address]. All rights reserved. This information is not intended as a substitute for professional medical care. Always follow your healthcare professional's instructions.

## 2019-05-17 NOTE — PROGRESS NOTES
0  SUBJECTIVE:   Krista Reddy is a 33 year old female who presents to clinic today for the following   health issues:    Concern - Eye Problem  Onset: Wednesday, 05/16/19    Description:   Left eye is infected, had goup and feels as if there is something in her eye. Has a couple dots on eyelids. Has eye lash extensions, has had for a couple years. Not sure if they switched products.    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  Has contacts in - just started new contact.  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  Only left that is bothering her.  Draining and goopy in the morning - feels scratchy    Has never reacted to extensions.    Additional history: as documented    Reviewed  and updated as needed this visit by clinical staff         Reviewed and updated as needed this visit by Provider         Patient Active Problem List   Diagnosis     Mild persistent asthma     CARDIOVASCULAR SCREENING; LDL GOAL LESS THAN 160     Cervical high risk HPV (human papillomavirus) test positive     Past Surgical History:   Procedure Laterality Date     NO HISTORY OF SURGERY         Social History     Tobacco Use     Smoking status: Former Smoker     Smokeless tobacco: Never Used   Substance Use Topics     Alcohol use: Yes     Comment: 5 times per week .      Family History   Problem Relation Age of Onset     Asthma Father      Asthma Son      Psychotic Disorder Son         ADHD     Breast Cancer Maternal Aunt          Current Outpatient Medications   Medication Sig Dispense Refill     albuterol (PROAIR HFA/PROVENTIL  HFA/VENTOLIN HFA) 108 (90 Base) MCG/ACT inhaler Inhale 2 puffs into the lungs every 6 hours as needed for shortness of breath / dyspnea 1 Inhaler 5     cetirizine (ZYRTEC) 10 MG tablet Take by mouth daily       fluticasone (FLOVENT HFA) 110 MCG/ACT inhaler Inhale 2 puffs into the lungs 2 times daily 1 Inhaler 11     levonorgestrel-ethinyl estradiol (AVIANE/ALESSE/LESSINA) 0.1-20 MG-MCG tablet Take 1 tablet by mouth daily 84 tablet 4     ranitidine (ZANTAC) 150 MG tablet Take 1 tablet (150 mg) by mouth 2 times daily 180 tablet 3     triamcinolone (KENALOG) 0.1 % external ointment Apply topically 2 times daily 30 g 1     No Known Allergies  No lab results found.   BP Readings from Last 3 Encounters:   05/17/19 116/68   03/19/19 111/78   02/21/19 92/70    Wt Readings from Last 3 Encounters:   05/17/19 46.3 kg (102 lb)   03/19/19 49.4 kg (109 lb)   02/21/19 49.4 kg (109 lb)                  Labs reviewed in EPIC    ROS:  Constitutional, HEENT, cardiovascular, pulmonary, GI, , musculoskeletal, neuro, skin, endocrine and psych systems are negative, except as otherwise noted.    OBJECTIVE:     /68   Pulse 80   Temp 98.4  F (36.9  C) (Tympanic)   Resp 16   Ht 1.524 m (5')   Wt 46.3 kg (102 lb)   SpO2 98%   BMI 19.92 kg/m    Body mass index is 19.92 kg/m .  GENERAL: healthy, alert and no distress  EYES: PERRL, EOMI, visual fields normal, eyelids- normal and conjunctiva/corneas- conjunctival injection OS  HENT: ear canals and TM's normal, nose and mouth without ulcers or lesions    Diagnostic Test Results:  none     ASSESSMENT/PLAN:     1. Acute conjunctivitis of left eye, unspecified acute conjunctivitis type   The risks, benefits and treatment options of prescribed medications or other treatments have been discussed with the patient. The patient verbalized their understanding and should call or follow up if no improvement or if they develop further problems.      Discussed contact care, eye care, and red  flags and proper follow up if not improving.    - trimethoprim-polymyxin b (POLYTRIM) 46704-7.1 UNIT/ML-% ophthalmic solution; Place 1-2 drops Into the left eye every 6 hours  Dispense: 10 mL; Refill: 0    Patient Instructions     Patient Education     What Is Conjunctivitis?    Conjunctivitis is an irritation or infection. It affects the membrane that covers the white of your eye and the inside of your eyelid (conjunctiva). It can happen to one or both eyes. The membrane swells and the blood vessels enlarge (dilate). This makes your eye red. That's why conjunctivitis is sometimes called red eye or pink eye.  What are the symptoms?  If you have one or more of these symptoms, see an eye healthcare provider:    Redness in and around your eye    Eyes that are puffy and sore    Itching, burning, or stinging eyes    Watery eyes or discharge from your eye    Eyelids that are crusty or stuck together when you wake up in the morning    Pink color in the whites of one or both eyes    Sensitivity to bright light  Getting treatment quickly can help prevent damage to your eyes.  How is it diagnosed?  Conjunctivitis is usually a minor eye infection. But it can sometimes become a more serious problem. Some more serious eye diseases have symptoms that look like conjunctivitis. So it's important for an eye healthcare provider to diagnose you. Your eye healthcare provider will ask about your symptoms and any medicines you take. He or she will ask about any illnesses or medical conditions you may have. The healthcare provider will also check your eyes with a hand-held light and a special microscope called a slit lamp.  Date Last Reviewed: 10/1/2017    6059-7523 [employer]. [address] [place] [address], [place] [address]. All rights reserved. This information is not intended as a substitute for professional medical care. Always follow your healthcare professional's instructions.               Jessica Gomez,  NP  [facility]

## 2019-05-17 NOTE — LETTER
[facility]  [address] [address]  Phone: [phone]    May 17, 2019        Krista Reddy  [address]  [place] [facility] [place] [address]          To whom it may concern:    RE: Krista Reddy    Patient was seen and treated today at our clinic and missed work.    Please contact me for questions or concerns.      Sincerely,        Jessica Gomez NP

## 2020-01-16 ENCOUNTER — OFFICE VISIT (OUTPATIENT)
Dept: FAMILY MEDICINE | Facility: CLINIC | Age: 35
End: 2020-01-16
Payer: COMMERCIAL

## 2020-01-16 VITALS
HEART RATE: 77 BPM | BODY MASS INDEX: 20.42 KG/M2 | WEIGHT: 104 LBS | OXYGEN SATURATION: 98 % | HEIGHT: 60 IN | DIASTOLIC BLOOD PRESSURE: 78 MMHG | SYSTOLIC BLOOD PRESSURE: 100 MMHG | TEMPERATURE: 98.3 F

## 2020-01-16 DIAGNOSIS — L30.9 ECZEMA, UNSPECIFIED TYPE: ICD-10-CM

## 2020-01-16 DIAGNOSIS — R87.610 ASCUS OF CERVIX WITH NEGATIVE HIGH RISK HPV: ICD-10-CM

## 2020-01-16 DIAGNOSIS — J45.30 MILD PERSISTENT ASTHMA WITHOUT COMPLICATION: ICD-10-CM

## 2020-01-16 DIAGNOSIS — Z11.3 SCREEN FOR STD (SEXUALLY TRANSMITTED DISEASE): ICD-10-CM

## 2020-01-16 DIAGNOSIS — Z30.41 ENCOUNTER FOR SURVEILLANCE OF CONTRACEPTIVE PILLS: ICD-10-CM

## 2020-01-16 DIAGNOSIS — K21.9 GASTROESOPHAGEAL REFLUX DISEASE WITHOUT ESOPHAGITIS: ICD-10-CM

## 2020-01-16 DIAGNOSIS — Z00.00 ROUTINE GENERAL MEDICAL EXAMINATION AT A HEALTH CARE FACILITY: Primary | ICD-10-CM

## 2020-01-16 LAB
CHOLEST SERPL-MCNC: 188 MG/DL
HBV SURFACE AB SERPL IA-ACNC: 1.38 M[IU]/ML
HBV SURFACE AG SERPL QL IA: NONREACTIVE
HCV AB SERPL QL IA: NONREACTIVE
HDLC SERPL-MCNC: 75 MG/DL
HIV 1+2 AB+HIV1 P24 AG SERPL QL IA: NONREACTIVE
LDLC SERPL CALC-MCNC: 89 MG/DL
NONHDLC SERPL-MCNC: 113 MG/DL
TRIGL SERPL-MCNC: 118 MG/DL

## 2020-01-16 PROCEDURE — 36415 COLL VENOUS BLD VENIPUNCTURE: CPT | Performed by: NURSE PRACTITIONER

## 2020-01-16 PROCEDURE — 80061 LIPID PANEL: CPT | Performed by: NURSE PRACTITIONER

## 2020-01-16 PROCEDURE — 86780 TREPONEMA PALLIDUM: CPT | Performed by: NURSE PRACTITIONER

## 2020-01-16 PROCEDURE — 87591 N.GONORRHOEAE DNA AMP PROB: CPT | Performed by: NURSE PRACTITIONER

## 2020-01-16 PROCEDURE — 99395 PREV VISIT EST AGE 18-39: CPT | Performed by: NURSE PRACTITIONER

## 2020-01-16 PROCEDURE — 87340 HEPATITIS B SURFACE AG IA: CPT | Performed by: NURSE PRACTITIONER

## 2020-01-16 PROCEDURE — 99213 OFFICE O/P EST LOW 20 MIN: CPT | Mod: 25 | Performed by: NURSE PRACTITIONER

## 2020-01-16 PROCEDURE — 86803 HEPATITIS C AB TEST: CPT | Performed by: NURSE PRACTITIONER

## 2020-01-16 PROCEDURE — 87624 HPV HI-RISK TYP POOLED RSLT: CPT | Performed by: NURSE PRACTITIONER

## 2020-01-16 PROCEDURE — 86706 HEP B SURFACE ANTIBODY: CPT | Performed by: NURSE PRACTITIONER

## 2020-01-16 PROCEDURE — 87491 CHLMYD TRACH DNA AMP PROBE: CPT | Performed by: NURSE PRACTITIONER

## 2020-01-16 PROCEDURE — 88141 CYTOPATH C/V INTERPRET: CPT | Performed by: NURSE PRACTITIONER

## 2020-01-16 PROCEDURE — 87389 HIV-1 AG W/HIV-1&-2 AB AG IA: CPT | Performed by: NURSE PRACTITIONER

## 2020-01-16 PROCEDURE — 88175 CYTOPATH C/V AUTO FLUID REDO: CPT | Performed by: NURSE PRACTITIONER

## 2020-01-16 RX ORDER — FLUTICASONE PROPIONATE 110 UG/1
2 AEROSOL, METERED RESPIRATORY (INHALATION) 2 TIMES DAILY
Qty: 1 INHALER | Refills: 11 | Status: SHIPPED | OUTPATIENT
Start: 2020-01-16 | End: 2021-03-22

## 2020-01-16 RX ORDER — LEVONORGESTREL/ETHIN.ESTRADIOL 0.1-0.02MG
1 TABLET ORAL DAILY
Qty: 84 TABLET | Refills: 4 | Status: SHIPPED | OUTPATIENT
Start: 2020-01-16 | End: 2021-03-30

## 2020-01-16 RX ORDER — ALBUTEROL SULFATE 90 UG/1
2 AEROSOL, METERED RESPIRATORY (INHALATION) EVERY 6 HOURS PRN
Qty: 1 INHALER | Refills: 5 | Status: SHIPPED | OUTPATIENT
Start: 2020-01-16 | End: 2021-01-18

## 2020-01-16 RX ORDER — TRIAMCINOLONE ACETONIDE 1 MG/G
OINTMENT TOPICAL 2 TIMES DAILY
Qty: 30 G | Refills: 1 | Status: SHIPPED | OUTPATIENT
Start: 2020-01-16 | End: 2022-02-04

## 2020-01-16 RX ORDER — FAMOTIDINE 20 MG/1
20 TABLET, FILM COATED ORAL 2 TIMES DAILY
Qty: 180 TABLET | Refills: 3 | Status: SHIPPED | OUTPATIENT
Start: 2020-01-16 | End: 2022-05-05

## 2020-01-16 ASSESSMENT — ASTHMA QUESTIONNAIRES
QUESTION_5 LAST FOUR WEEKS HOW WOULD YOU RATE YOUR ASTHMA CONTROL: WELL CONTROLLED
QUESTION_2 LAST FOUR WEEKS HOW OFTEN HAVE YOU HAD SHORTNESS OF BREATH: ONCE OR TWICE A WEEK
QUESTION_3 LAST FOUR WEEKS HOW OFTEN DID YOUR ASTHMA SYMPTOMS (WHEEZING, COUGHING, SHORTNESS OF BREATH, CHEST TIGHTNESS OR PAIN) WAKE YOU UP AT NIGHT OR EARLIER THAN USUAL IN THE MORNING: NOT AT ALL
QUESTION_4 LAST FOUR WEEKS HOW OFTEN HAVE YOU USED YOUR RESCUE INHALER OR NEBULIZER MEDICATION (SUCH AS ALBUTEROL): ONCE A WEEK OR LESS
ACT_TOTALSCORE: 22
QUESTION_1 LAST FOUR WEEKS HOW MUCH OF THE TIME DID YOUR ASTHMA KEEP YOU FROM GETTING AS MUCH DONE AT WORK, SCHOOL OR AT HOME: NONE OF THE TIME

## 2020-01-16 ASSESSMENT — MIFFLIN-ST. JEOR: SCORE: 1093.24

## 2020-01-16 NOTE — PATIENT INSTRUCTIONS
Preventative Care Visits include: Yearly physicals, Well-child visits, Welcome to Medicare visits, & Medicare yearly wellness exams.    The purpose of these visits is to discuss your medical history and prevent health problems before you are sick.  You may need to pay a copay, coinsurance or deductible if your visit today includes testing or treating for a new or existing condition.    Additional charges may be incurred for today's visit. If you have questions about what your insurance plan covers, please contact your Insurance Company's member service department.  If you have questions specific to a bill you have already received from [facility], please contact the [employer] Billing Office at [phone].     Preventive Health Recommendations  Female Ages 26 - 39  Yearly exam:   See your health care provider every year in order to    Review health changes.     Discuss preventive care.      Review your medicines if you your doctor has prescribed any.    Until age 30: Get a Pap test every three years (more often if you have had an abnormal result).    After age 30: Talk to your doctor about whether you should have a Pap test every 3 years or have a Pap test with HPV screening every 5 years.   You do not need a Pap test if your uterus was removed (hysterectomy) and you have not had cancer.  You should be tested each year for STDs (sexually transmitted diseases), if you're at risk.   Talk to your provider about how often to have your cholesterol checked.  If you are at risk for diabetes, you should have a diabetes test (fasting glucose).  Shots: Get a flu shot each year. Get a tetanus shot every 10 years.   Nutrition:     Eat at least 5 servings of fruits and vegetables each day.    Eat whole-grain bread, whole-wheat pasta and brown rice instead of white grains and rice.    Get adequate Calcium and Vitamin D.     Lifestyle    Exercise at least 150 minutes a week (30 minutes a day, 5 days of the week). This will help you  control your weight and prevent disease.    Limit alcohol to one drink per day.    No smoking.     Wear sunscreen to prevent skin cancer.    See your dentist every six months for an exam and cleaning.

## 2020-01-16 NOTE — LETTER
January 24, 2020    Krista Reddy  [address]  [facility] [address]      Dear ,      This letter is in regards to your recent cervical cancer screening (Pap smear and HPV test).    Your Pap smear result was reported as ASCUS or Atypical Squamous Cells of Undetermined Significance. This means that there were mildly abnormal cells found in the sample that we collected from your cervix. The vast majority of patients with this result do not have significant cervical abnormalities.     Your cervical sample was also tested for the presence of Human Papillomavirus (HPV). Your HPV test is NEGATIVE for high risk HPV, meaning that no HPV was found at this time.     Over time, your body can get rid of these abnormal cells, so it is recommended that you repeat your Pap smear and HPV test in 1 year.    If you have additional questions regarding this result, please call our registered nurse, Shae at [phone].    Please continue to be seen every year for an annual physical exam and other preventative tests.         Sincerely,    RAVI Smith CNP/rlm

## 2020-01-16 NOTE — PROGRESS NOTES
SUBJECTIVE:   CC: Krista Reddy is an 34 year old woman who presents for preventive health visit.     Healthy Habits:    Do you get at least three servings of calcium containing foods daily (dairy, green leafy vegetables, etc.)? yes    Amount of exercise or daily activities, outside of work: at work- walking a lot    Problems taking medications regularly No    Medication side effects: No    Have you had an eye exam in the past two years? yes    Do you see a dentist twice per year? yes    Do you have sleep apnea, excessive snoring or daytime drowsiness?no      Refill OCP:  Working well for her - would like to continue    GERD:  Taking ranitidine as needed  Well controlled.    Eczema:  Triamcinolone working for flares.      PROBLEMS TO ADD ON...  Asthma Follow-Up  Well controlled per patient.  Was ACT completed today?    Yes    ACT Total Scores 1/16/2020   ACT TOTAL SCORE -   ASTHMA ER VISITS -   ASTHMA HOSPITALIZATIONS -   ACT TOTAL SCORE (Goal Greater than or Equal to 20) 22   In the past 12 months, how many times did you visit the emergency room for your asthma without being admitted to the hospital? 0   In the past 12 months, how many times were you hospitalized overnight because of your asthma? 0       How many days per week do you miss taking your asthma controller medication?  I do not have an asthma controller medication- hasn't had insurance so hasn't had the inhaler in 6 months    Please describe any recent triggers for your asthma: smoke, upper respiratory infections, animal dander and strong odors and fumes    Have you had any Emergency Room Visits, Urgent Care Visits, or Hospital Admissions since your last office visit?  No      Today's PHQ-2 Score:   PHQ-2 ( 1999 [employer]) 1/16/2020 5/17/2019   Q1: Little interest or pleasure in doing things 0 0   Q2: Feeling down, depressed or hopeless 0 0   PHQ-2 Score 0 0       Abuse: Current or Past(Physical, Sexual or Emotional)- No  Do you feel safe in your  environment? Yes        Social History     Tobacco Use     Smoking status: Former Smoker     Smokeless tobacco: Never Used   Substance Use Topics     Alcohol use: Yes     Comment: 5 times per week .      If you drink alcohol do you typically have >3 drinks per day or >7 drinks per week? No                     Reviewed orders with patient.  Reviewed health maintenance and updated orders accordingly - Yes        History of abnormal Pap smear: YES - updated in Problem List and Health Maintenance accordingly  PAP / HPV Latest Ref Rng & Units 2/21/2019 3/29/2018 2/28/2017   PAP - NIL ASC-US(A) NIL   HPV 16 DNA NEG:Negative Positive(A) Positive(A) Negative   HPV 18 DNA NEG:Negative Negative Negative Negative   OTHER HR HPV NEG:Negative Positive(A) Positive(A) Positive(A)     Reviewed and updated as needed this visit by clinical staff  Tobacco  Allergies  Meds         Reviewed and updated as needed this visit by Provider            ROS:  CONSTITUTIONAL: NEGATIVE for fever, chills, change in weight  INTEGUMENTARU/SKIN: NEGATIVE for worrisome rashes, moles or lesions  EYES: NEGATIVE for vision changes or irritation  ENT: NEGATIVE for ear, mouth and throat problems  RESP: NEGATIVE for significant cough or SOB  BREAST: NEGATIVE for masses, tenderness or discharge  CV: NEGATIVE for chest pain, palpitations or peripheral edema  GI: NEGATIVE for nausea, abdominal pain, heartburn, or change in bowel habits  : NEGATIVE for unusual urinary or vaginal symptoms. Periods are regular.  MUSCULOSKELETAL: NEGATIVE for significant arthralgias or myalgia  NEURO: NEGATIVE for weakness, dizziness or paresthesias  PSYCHIATRIC: NEGATIVE for changes in mood or affect    OBJECTIVE:   /78 (BP Location: Right arm)   Pulse 77   Temp 98.3  F (36.8  C) (Tympanic)   Ht 1.524 m (5')   Wt 47.2 kg (104 lb)   LMP 01/01/2020 (Exact Date)   SpO2 98%   Breastfeeding No   BMI 20.31 kg/m    EXAM:  GENERAL: healthy, alert and no distress  EYES:  Eyes grossly normal to inspection, PERRL and conjunctivae and sclerae normal  HENT: ear canals and TM's normal, nose and mouth without ulcers or lesions  NECK: no adenopathy, no asymmetry, masses, or scars and thyroid normal to palpation  RESP: lungs clear to auscultation - no rales, rhonchi or wheezes  BREAST: normal without masses, tenderness or nipple discharge and no palpable axillary masses or adenopathy  CV: regular rate and rhythm, normal S1 S2, no S3 or S4, no murmur, click or rub, no peripheral edema and peripheral pulses strong  ABDOMEN: soft, nontender, no hepatosplenomegaly, no masses and bowel sounds normal   (female): normal female external genitalia, normal urethral meatus, vaginal mucosa pink, moist, well rugated, and normal cervix/adnexa/uterus without masses or discharge  MS: no gross musculoskeletal defects noted, no edema  SKIN: no suspicious lesions or rashes  NEURO: Normal strength and tone, mentation intact and speech normal  PSYCH: mentation appears normal, affect normal/bright        ASSESSMENT/PLAN:       ICD-10-CM    1. Routine general medical examination at a health care facility Z00.00 INFLUENZA VACCINE IM > 6 MONTHS VALENT IIV4 [12910]     HPV High Risk Types DNA Cervical     NEISSERIA GONORRHOEA PCR     CHLAMYDIA TRACHOMATIS PCR     Pap imaged thin layer diagnostic with HPV (select HPV order below)     Lipid panel reflex to direct LDL Fasting   2. Encounter for surveillance of contraceptive pills Z30.41 levonorgestrel-ethinyl estradiol (AVIANE/ALESSE/LESSINA) 0.1-20 MG-MCG tablet     3. Mild persistent asthma without complication J45.30 Well controlled.  fluticasone (FLOVENT HFA) 110 MCG/ACT inhaler     albuterol (PROAIR HFA/PROVENTIL HFA/VENTOLIN HFA) 108 (90 Base) MCG/ACT inhaler     OFFICE/OUTPT VISIT,EST,LEVL III     4. Eczema, unspecified type L30.9 Intermittent issue - responds well to steroid ointment.  triamcinolone (KENALOG) 0.1 % external ointment     OFFICE/OUTPT  VISIT,EST,LEVL III     5. Gastroesophageal reflux disease without esophagitis K21.9 Well controlled.  ranitidine (ZANTAC) 150 MG tablet     OFFICE/OUTPT VISIT,EST,LEVL III     6. Screen for STD (sexually transmitted disease) Z11.3 HIV Antigen Antibody Combo     Hepatitis C antibody     Hepatitis B Surface Antibody     Hepatitis B surface antigen     Treponema Abs w Reflex to RPR and Titer       COUNSELING:   Reviewed preventive health counseling, as reflected in patient instructions    Estimated body mass index is 20.31 kg/m  as calculated from the following:    Height as of this encounter: 1.524 m (5').    Weight as of this encounter: 47.2 kg (104 lb).         reports that she has quit smoking. She has never used smokeless tobacco.    The risks, benefits and treatment options of prescribed medications or other treatments have been discussed with the patient. The patient verbalized their understanding and should call or follow up if no improvement or if they develop further problems.    RAVI Smith [facility]

## 2020-01-16 NOTE — LETTER
[facility]  [address]  [place] [address]  Phone: [phone]      1/16/2020     Krista Reddy  [address]  [place] [address]      Dear Krista:    Thank you for allowing me to participate in your care. Your recent test results were reviewed and listed below.      Cholesterol is normal.   Shakila Reilly CNP     Results for orders placed or performed in visit on 01/16/20   Lipid panel reflex to direct LDL Fasting     Status: None   Result Value Ref Range    Cholesterol 188 <200 mg/dL    Triglycerides 118 <150 mg/dL    HDL Cholesterol 75 >49 mg/dL    LDL Cholesterol Calculated 89 <100 mg/dL    Non HDL Cholesterol 113 <130 mg/dL           Thank you for choosing [facility]. As a result, please continue with the treatment plan discussed in the office. Return as discussed or sooner if symptoms worsen or fail to improve.     If you have any further questions or concerns, please do not hesitate to contact us.      Sincerely,      Shakila Reilly

## 2020-01-17 LAB
C TRACH DNA SPEC QL NAA+PROBE: NEGATIVE
N GONORRHOEA DNA SPEC QL NAA+PROBE: NEGATIVE
SPECIMEN SOURCE: NORMAL
SPECIMEN SOURCE: NORMAL
T PALLIDUM AB SER QL: NONREACTIVE

## 2020-01-17 ASSESSMENT — ASTHMA QUESTIONNAIRES: ACT_TOTALSCORE: 22

## 2020-01-20 ENCOUNTER — ALLIED HEALTH/NURSE VISIT (OUTPATIENT)
Dept: FAMILY MEDICINE | Facility: CLINIC | Age: 35
End: 2020-01-20
Payer: COMMERCIAL

## 2020-01-20 DIAGNOSIS — Z23 NEED FOR VACCINATION: Primary | ICD-10-CM

## 2020-01-20 PROCEDURE — 90471 IMMUNIZATION ADMIN: CPT

## 2020-01-20 PROCEDURE — 99207 ZZC NO CHARGE NURSE ONLY: CPT

## 2020-01-20 PROCEDURE — 90746 HEPB VACCINE 3 DOSE ADULT IM: CPT

## 2020-01-20 NOTE — NURSING NOTE
Chief Complaint   Patient presents with     Imm/Inj     Hep B - Adult        Initial LMP 01/01/2020 (Exact Date)  Estimated body mass index is 20.31 kg/m  as calculated from the following:    Height as of 1/16/20: 1.524 m (5').    Weight as of 1/16/20: 47.2 kg (104 lb).    Medication Reconciliation:  unable or not appropriate to perform    Valencia Shelton CMA (AAMA)    Prior to immunization administration, verified patients identity using patient s name and date of birth. Please see Immunization Activity for additional information.     Screening Questionnaire for Adult Immunization    Are you sick today?   No   Do you have allergies to medications, food, a vaccine component or latex?   No   Have you ever had a serious reaction after receiving a vaccination?   No   Do you have a long-term health problem with heart, lung, kidney, or metabolic disease (e.g., diabetes), asthma, a blood disorder, no spleen, complement component deficiency, a cochlear implant, or a spinal fluid leak?  Are you on long-term aspirin therapy?   No   Do you have cancer, leukemia, HIV/AIDS, or any other immune system problem?   No   Do you have a parent, brother, or sister with an immune system problem?   No   In the past 3 months, have you taken medications that affect  your immune system, such as prednisone, other steroids, or anticancer drugs; drugs for the treatment of rheumatoid arthritis, Crohn s disease, or psoriasis; or have you had radiation treatments?   No   Have you had a seizure, or a brain or other nervous system problem?   No   During the past year, have you received a transfusion of blood or blood    products, or been given immune (gamma) globulin or antiviral drug?   No   For women: Are you pregnant or is there a chance you could become       pregnant during the next month?   No   Have you received any vaccinations in the past 4 weeks?   No     Immunization questionnaire answers were all negative.        Per orders of Dr. Prado,  injection of Hep B - Adult given by Valencia Shelton CMA. Patient instructed to remain in clinic for 15 minutes afterwards, and to report any adverse reaction to me immediately.       Screening performed by Valencia Shelton CMA on 1/20/2020 at 11:40 AM.

## 2020-01-22 LAB
COPATH REPORT: ABNORMAL
PAP: ABNORMAL

## 2020-01-23 LAB
FINAL DIAGNOSIS: NORMAL
HPV HR 12 DNA CVX QL NAA+PROBE: NEGATIVE
HPV16 DNA SPEC QL NAA+PROBE: NEGATIVE
HPV18 DNA SPEC QL NAA+PROBE: NEGATIVE
SPECIMEN DESCRIPTION: NORMAL
SPECIMEN SOURCE CVX/VAG CYTO: NORMAL

## 2020-03-23 ENCOUNTER — VIRTUAL VISIT (OUTPATIENT)
Dept: FAMILY MEDICINE | Facility: CLINIC | Age: 35
End: 2020-03-23
Payer: COMMERCIAL

## 2020-03-23 DIAGNOSIS — R09.81 CONGESTION OF PARANASAL SINUS: Primary | ICD-10-CM

## 2020-03-23 PROCEDURE — 99441 ZZC PHYSICIAN TELEPHONE EVALUATION 5-10 MIN: CPT | Performed by: FAMILY MEDICINE

## 2020-03-23 RX ORDER — FLUTICASONE PROPIONATE 50 MCG
1 SPRAY, SUSPENSION (ML) NASAL DAILY
Qty: 1 G | Refills: 11 | Status: SHIPPED | OUTPATIENT
Start: 2020-03-23 | End: 2022-05-05

## 2020-03-23 RX ORDER — CETIRIZINE HYDROCHLORIDE, PSEUDOEPHEDRINE HYDROCHLORIDE 5; 120 MG/1; MG/1
1 TABLET, FILM COATED, EXTENDED RELEASE ORAL 2 TIMES DAILY
Qty: 20 TABLET | Refills: 0 | Status: SHIPPED | OUTPATIENT
Start: 2020-03-23 | End: 2020-04-14

## 2020-03-23 NOTE — PROGRESS NOTES
"rKista Reddy is a 34 year old female who is being evaluated via a billable telephone visit.      The patient has been notified of following:       This is a virtual visit for 34 yr old female here for sinus pressure ongoing for about a week. She reports stuffy nose, headaches, sinus pressure. She reports a low grade fever. She denies any cough. Denies any chest pain or shortness of breath. She has tried some allergy medication over the counter with minimal relief.          \"This telephone visit will be conducted via a call between you and your physician/provider. We have found that certain health care needs can be provided without the need for a physical exam.  This service lets us provide the care you need with a short phone conversation.  If a prescription is necessary we can send it directly to your pharmacy.  If lab work is needed we can place an order for that and you can then stop by our lab to have the test done at a later time.    If during the course of the call the physician/provider feels a telephone visit is not appropriate, you will not be charged for this service.\"     Krista Reddy complains of    Chief Complaint   Patient presents with     Sinus Problem     Refill Request     flonase has been on in the past        I have reviewed and updated the patient's Past Medical History, Social History, Family History and Medication List.    ALLERGIES  Patient has no known allergies.  Medication Followup of Flonase     Taking Medication as prescribed: NO    Side Effects:  None    Medication Helping Symptoms:  Yes when she is using      Acute Illness   Acute illness concerns: sinus   Onset: on and off for 1 week ago     Fever: no    Chills/Sweats: no    Headache (location?): YES    Sinus Pressure:YES    Conjunctivitis:  no    Ear Pain: no    Rhinorrhea: YES    Congestion: YES    Sore Throat: no      Cough: no    Wheeze: no     Decreased Appetite: no    Nausea: no    Vomiting: no    Diarrhea:  " no    Dysuria/Freq.: no    Fatigue/Achiness: YES- little    Sick/Strep Exposure: YES- elderly      Therapies Tried and outcome: zyrtec allergy medication       Additional provider notes:     Assessment/Plan:  1. Congestion of paranasal sinus  Patient asked to give Flonase a trial and also some decongestants. If her symptoms do not improve by mid week she is asked to call the clinic for antibiotics.   - fluticasone (FLONASE) 50 MCG/ACT nasal spray; Spray 1 spray into both nostrils daily  Dispense: 1 g; Refill: 11  - cetirizine-pseudoePHEDrine ER (ZYRTEC-D) 5-120 MG 12 hr tablet; Take 1 tablet by mouth 2 times daily  Dispense: 20 tablet; Refill: 0    Phone call duration:  5-10  minutes    Jenaro Rosas MD

## 2020-04-14 DIAGNOSIS — R09.81 CONGESTION OF PARANASAL SINUS: ICD-10-CM

## 2020-04-14 RX ORDER — CETIRIZINE HYDROCHLORIDE, PSEUDOEPHEDRINE HYDROCHLORIDE 5; 120 MG/1; MG/1
1 TABLET, FILM COATED, EXTENDED RELEASE ORAL 2 TIMES DAILY
Qty: 20 TABLET | Refills: 0 | Status: SHIPPED | OUTPATIENT
Start: 2020-04-14 | End: 2021-03-12

## 2020-11-30 ENCOUNTER — OFFICE VISIT (OUTPATIENT)
Dept: FAMILY MEDICINE | Facility: CLINIC | Age: 35
End: 2020-11-30

## 2020-11-30 VITALS
BODY MASS INDEX: 20.43 KG/M2 | DIASTOLIC BLOOD PRESSURE: 80 MMHG | OXYGEN SATURATION: 99 % | RESPIRATION RATE: 12 BRPM | SYSTOLIC BLOOD PRESSURE: 116 MMHG | WEIGHT: 104.6 LBS | TEMPERATURE: 99.3 F | HEART RATE: 97 BPM

## 2020-11-30 DIAGNOSIS — L02.92 BOIL: Primary | ICD-10-CM

## 2020-11-30 DIAGNOSIS — L03.115 CELLULITIS OF RIGHT LOWER EXTREMITY: ICD-10-CM

## 2020-11-30 PROCEDURE — 10060 I&D ABSCESS SIMPLE/SINGLE: CPT | Performed by: NURSE PRACTITIONER

## 2020-11-30 PROCEDURE — 99213 OFFICE O/P EST LOW 20 MIN: CPT | Mod: 25 | Performed by: NURSE PRACTITIONER

## 2020-11-30 RX ORDER — MUPIROCIN 20 MG/G
OINTMENT TOPICAL 3 TIMES DAILY
Qty: 15 G | Refills: 0 | Status: SHIPPED | OUTPATIENT
Start: 2020-11-30 | End: 2020-12-10

## 2020-11-30 RX ORDER — SULFAMETHOXAZOLE/TRIMETHOPRIM 800-160 MG
1 TABLET ORAL 2 TIMES DAILY
Qty: 20 TABLET | Refills: 0 | Status: SHIPPED | OUTPATIENT
Start: 2020-11-30 | End: 2020-12-10

## 2020-11-30 RX ORDER — LIDOCAINE HYDROCHLORIDE AND EPINEPHRINE 10; 10 MG/ML; UG/ML
1 INJECTION, SOLUTION INFILTRATION; PERINEURAL ONCE
Status: COMPLETED | OUTPATIENT
Start: 2020-11-30 | End: 2020-11-30

## 2020-11-30 RX ADMIN — LIDOCAINE HYDROCHLORIDE AND EPINEPHRINE 1 ML: 10; 10 INJECTION, SOLUTION INFILTRATION; PERINEURAL at 10:30

## 2020-11-30 ASSESSMENT — ENCOUNTER SYMPTOMS
CONSTITUTIONAL NEGATIVE: 1
RESPIRATORY NEGATIVE: 1
WOUND: 1
CARDIOVASCULAR NEGATIVE: 1

## 2020-11-30 NOTE — NURSING NOTE
Initial /80   Pulse 97   Temp 99.3  F (37.4  C) (Tympanic)   Resp 12   Wt 47.4 kg (104 lb 9.6 oz)   SpO2 99%   BMI 20.43 kg/m   Estimated body mass index is 20.43 kg/m  as calculated from the following:    Height as of 1/16/20: 1.524 m (5').    Weight as of this encounter: 47.4 kg (104 lb 9.6 oz). .

## 2020-11-30 NOTE — PROGRESS NOTES
Subjective     Krista Reddy is a 35 year old female who presents to clinic today for the following health issues:    HPI            Derm Issue    Symptoms painful for 3 days.     States skin lesion on right inner thigh/groin.     Has tried OTC salve. States not helpful.     Possibly caused from shaving.    No new medications, lotions, soaps, detergents, or travel.    Additional provider notes: Severe pain. Started as a zit. She popped it and it has since gotten worse. Hard to sit due to pain/inflammation.     Elevated temp - no other symptoms    Review of Systems   Constitutional: Negative.    HENT: Negative.    Respiratory: Negative.    Cardiovascular: Negative.    Skin: Positive for wound (right inner thigh swelling, pain, redness).            Objective    /80   Pulse 97   Temp 99.3  F (37.4  C) (Tympanic)   Resp 12   Wt 47.4 kg (104 lb 9.6 oz)   SpO2 99%   BMI 20.43 kg/m    Body mass index is 20.43 kg/m .  Physical Exam  Vitals signs and nursing note reviewed.   Constitutional:       General: She is not in acute distress.     Appearance: Normal appearance. She is not ill-appearing or toxic-appearing.   Skin:         Neurological:      Mental Status: She is alert.        PROCEDURE: I&D OF BOIL:  Verbal consent obtained and discussed including, but not limited to, risk of infection and bleeding. Area cleaned with betadine x 3. Lidocaine 1% with epinephrine used for numbing. Allowed adequate time for numbing. Scapel incision used to make a stab incision to area. Moderate amount of cloudy fluid with some bloody drainage was drained. Packing was not needed. Patient tolerated procedure well. No complications. Hemostasis achieved and site dressed with gauze and tape.             Assessment & Plan     Boil  Exam consistent with boil. See procedure notes above. Patient tolerated well and vocalized relief at end of procedure with decrease in pressure/pain to that area. Surrounding cellulitis. Prescribed  bactrim and bactroban. Side effects, risks and benefits of medication were discussed with patient. Discussed how and when to take medication. Recommended taking a probiotic and/or eating yogurt every day while on antibiotics and for ~1 week after stopping antibiotics to prevent GI upset. Discussed OTC recommendations for symptom control. Rest, hydration, humidification. Advised on dressing changes. Follow-up if symptoms do not improve or worsen over the next few days.    - sulfamethoxazole-trimethoprim (BACTRIM DS) 800-160 MG tablet; Take 1 tablet by mouth 2 times daily for 10 days  - lidocaine 1 % 1 mL  - mupirocin (BACTROBAN) 2 % external ointment; Apply topically 3 times daily for 10 days    Cellulitis of right lower extremity    - sulfamethoxazole-trimethoprim (BACTRIM DS) 800-160 MG tablet; Take 1 tablet by mouth 2 times daily for 10 days  - mupirocin (BACTROBAN) 2 % external ointment; Apply topically 3 times daily for 10 days        Patient Instructions   Cellulitis:  1. Warm compresses to the area 4-5 times a day.  2. Keep the area elevated (if applicable) and at rest.  3. Take antibiotic as prescribed.  4. Take probiotic or yogurt daily while on antibiotic to help prevent GI upset.   5. Use bactroban ointment as prescribed. Change dressing 2-3 times a day and as needed if wet from drainage.  6. Take ibuprofen 800mg every 8 hours as needed for pain/swelling.        Return if symptoms worsen or fail to improve.    RAVI Resendez [facility]

## 2020-11-30 NOTE — LETTER
November 30, 2020      Krista Reddy  [address]  [facility] [place] [address]        To Whom It May Concern:    Krista Reddy was seen on 11/30/2020.  Please excuse her through Wednesday 12/2/2020 due to illness (not COVID related).        Sincerely,        RAVI Resendez CNP

## 2020-11-30 NOTE — PATIENT INSTRUCTIONS
Cellulitis:  1. Warm compresses to the area 4-5 times a day.  2. Keep the area elevated (if applicable) and at rest.  3. Take antibiotic as prescribed.  4. Take probiotic or yogurt daily while on antibiotic to help prevent GI upset.   5. Use bactroban ointment as prescribed. Change dressing 2-3 times a day and as needed if wet from drainage.  6. Take ibuprofen 800mg every 8 hours as needed for pain/swelling.

## 2020-12-21 ENCOUNTER — OFFICE VISIT (OUTPATIENT)
Dept: FAMILY MEDICINE | Facility: CLINIC | Age: 35
End: 2020-12-21

## 2020-12-21 VITALS
BODY MASS INDEX: 20.03 KG/M2 | DIASTOLIC BLOOD PRESSURE: 64 MMHG | SYSTOLIC BLOOD PRESSURE: 110 MMHG | TEMPERATURE: 98.1 F | OXYGEN SATURATION: 98 % | WEIGHT: 102 LBS | HEART RATE: 73 BPM | HEIGHT: 60 IN | RESPIRATION RATE: 16 BRPM

## 2020-12-21 DIAGNOSIS — L02.415 ABSCESS OF RIGHT THIGH: Primary | ICD-10-CM

## 2020-12-21 PROCEDURE — 99213 OFFICE O/P EST LOW 20 MIN: CPT | Mod: 25 | Performed by: NURSE PRACTITIONER

## 2020-12-21 PROCEDURE — 10060 I&D ABSCESS SIMPLE/SINGLE: CPT | Performed by: NURSE PRACTITIONER

## 2020-12-21 RX ORDER — DOXYCYCLINE HYCLATE 100 MG
100 TABLET ORAL 2 TIMES DAILY
Qty: 20 TABLET | Refills: 0 | Status: SHIPPED | OUTPATIENT
Start: 2020-12-21 | End: 2020-12-31

## 2020-12-21 ASSESSMENT — MIFFLIN-ST. JEOR: SCORE: 1079.17

## 2020-12-21 NOTE — PATIENT INSTRUCTIONS
"Take doxycycline twice daily with food. Take a probiotic such as Culturelle or Florastor (recommend lactobacillus 50 billion CFU twice daily  by at least one hour from the antibiotic) while on the antibiotic or eat a Greek yogurt containing \"live active cultures\" daily.    Try to leave the packing in place. If it comes out, warm tub soaks as discussed.     Recheck in clinic on Wednesday    "

## 2020-12-21 NOTE — LETTER
December 21, 2020      Krista Reddy  [address]  [facility] [place] [address]        To Whom It May Concern:    Krista Reddy  was seen on 12/21/2020.  Please excuse her  until 12/22/2020 due to illness.        Sincerely,        RAVI Grewal CNP

## 2020-12-21 NOTE — PROGRESS NOTES
"Subjective     Krista Reddy is a 35 year old female who presents to clinic today for the following health issues:    HPI         Chief Complaint   Patient presents with     Derm Problem     derm spot on buttocks  - that was dxd' with cellulitis     Above HPI reviewed. Additionally, seen on 11/30 for abscess in the same area, I and D was performed, rx'd Bactrim x 10 days which she took. Wound was nearly completely resolved. Notes she woke up this morning and the area was red, hot and swollen again. No fever.        Review of Systems   Constitutional, HEENT, cardiovascular, pulmonary, gi and gu systems are negative, except as otherwise noted.      Objective    /64 (BP Location: Right arm, Patient Position: Sitting, Cuff Size: Adult Regular)   Pulse 73   Temp 98.1  F (36.7  C) (Tympanic)   Resp 16   Ht 1.524 m (5')   Wt 46.3 kg (102 lb)   LMP 11/25/2020 (Approximate)   SpO2 98%   Breastfeeding No   BMI 19.92 kg/m    Body mass index is 19.92 kg/m .  Physical Exam   GENERAL: healthy, alert and no distress  SKIN: Just distal to the right inguinal area is a fluctuant 7nxb5pm lesion with approx 5cm surrounding erythema    PROCEDURE: Incision and Drainage   INDICATIONS: Localized abscess   PROCEDURE PROVIDER: RAVI Grewal CNP     SITE: Right thigh   MEDICATION:  Lidocaine 1% without epi Injecting 1.5 mls.   NOTE: The area was prepped with chlorhexidine and draped off in the usual sterile fashion.  Local anesthetic was injected subcutaneously with anesthesia effects demonstrated prior to proceeding.  The area of maximal fluctuance was opened with a 11 blade using horizontal incision to allow for drainage.  The abscess was drained.  The abscess cavity was bluntly explored to separate any loculations.  1/2\" iodoform was placed into the abscess cavity.  A sterile dressing was placed over the area.   COMPLEXITY: Simple    Simple = single, furuncle, paronychia, superficial  Complex = multiple or " "abscess requiring probing, loculations, packing placement   COMPLICATIONS:  None             Assessment & Plan     Abscess of right thigh  I and D as above. Packed, recheck in clinic in 2 days for packing removal and wound reassessment. Given large area of surrounding cellulitis, will start doxycycline.   - doxycycline hyclate (VIBRA-TABS) 100 MG tablet; Take 1 tablet (100 mg) by mouth 2 times daily for 10 days        Patient Instructions   Take doxycycline twice daily with food. Take a probiotic such as Culturelle or Florastor (recommend lactobacillus 50 billion CFU twice daily  by at least one hour from the antibiotic) while on the antibiotic or eat a Greek yogurt containing \"live active cultures\" daily.    Try to leave the packing in place. If it comes out, warm tub soaks as discussed.     Recheck in clinic on Wednesday        Return in about 2 days (around 12/23/2020).    RAVI Grewal [facility]    "

## 2021-01-04 ENCOUNTER — PATIENT OUTREACH (OUTPATIENT)
Dept: FAMILY MEDICINE | Facility: CLINIC | Age: 36
End: 2021-01-04

## 2021-01-04 DIAGNOSIS — R87.610 ASCUS OF CERVIX WITH NEGATIVE HIGH RISK HPV: ICD-10-CM

## 2021-01-04 NOTE — TELEPHONE ENCOUNTER
Patient due for Pap and HPV.    Reminder done today via letter.    Shae Gaitan, RN  Pap Tracking

## 2021-01-10 ENCOUNTER — TELEPHONE (OUTPATIENT)
Dept: FAMILY MEDICINE | Facility: CLINIC | Age: 36
End: 2021-01-10

## 2021-01-10 NOTE — TELEPHONE ENCOUNTER
Prior Authorization Retail Medication Request    Medication/Dose: albtuerol hfa  ICD code (if different than what is on RX):    Previously Tried and Failed:  flovent hfa; albuterol hfa  Rationale:  Patient has used since 2013    Insurance Name:  [phone]  Insurance ID:  [id number]      Pharmacy Information (if different than what is on RX)  Name:    Phone:

## 2021-01-12 NOTE — TELEPHONE ENCOUNTER
Prior Authorization Not Needed per Insurance    Medication: albtuerol hfa  Insurance Company: [employer] - Phone [phone] Fax [phone]  Expected CoPay:      Pharmacy Filling the Rx: [id number] - [place], [place] - [facility] AT [facility] [address]  Pharmacy Notified: Yes  Patient Notified: No    Called pharmacy and they will fill with whatever patient's insurance prefers (albuteral, ventolin, proair). No PA is needed.

## 2021-01-14 DIAGNOSIS — J45.30 MILD PERSISTENT ASTHMA WITHOUT COMPLICATION: ICD-10-CM

## 2021-01-14 NOTE — TELEPHONE ENCOUNTER
"Requested Prescriptions   Pending Prescriptions Disp Refills     albuterol (PROAIR HFA/PROVENTIL HFA/VENTOLIN HFA) 108 (90 Base) MCG/ACT inhaler 1 Inhaler 5     Sig: Inhale 2 puffs into the lungs every 6 hours as needed for shortness of breath / dyspnea       Asthma Maintenance Inhalers - Anticholinergics Failed - 1/14/2021  4:34 PM        Failed - Asthma control assessment score within normal limits in last 6 months     Please review ACT score.           Passed - Patient is age 12 years or older        Passed - Medication is active on med list        Passed - Recent (6 mo) or future (30 days) visit within the authorizing provider's specialty     Patient had office visit in the last 6 months or has a visit in the next 30 days with authorizing provider or within the authorizing provider's specialty.  See \"Patient Info\" tab in inbasket, or \"Choose Columns\" in Meds & Orders section of the refill encounter.           Short-Acting Beta Agonist Inhalers Protocol  Failed - 1/14/2021  4:34 PM        Failed - Asthma control assessment score within normal limits in last 6 months     Please review ACT score.           Passed - Patient is age 12 or older        Passed - Medication is active on med list        Passed - Recent (6 mo) or future (30 days) visit within the authorizing provider's specialty     Patient had office visit in the last 6 months or has a visit in the next 30 days with authorizing provider or within the authorizing provider's specialty.  See \"Patient Info\" tab in inbasket, or \"Choose Columns\" in Meds & Orders section of the refill encounter.                 "

## 2021-01-18 RX ORDER — ALBUTEROL SULFATE 90 UG/1
2 AEROSOL, METERED RESPIRATORY (INHALATION) EVERY 6 HOURS PRN
Qty: 1 INHALER | Refills: 5 | Status: SHIPPED | OUTPATIENT
Start: 2021-01-18 | End: 2022-05-05

## 2021-01-18 NOTE — TELEPHONE ENCOUNTER
ACT Total Scores 3/13/2018 2/4/2019 1/16/2020   ACT TOTAL SCORE - - -   ASTHMA ER VISITS - - -   ASTHMA HOSPITALIZATIONS - - -   ACT TOTAL SCORE (Goal Greater than or Equal to 20) 24 22 22   In the past 12 months, how many times did you visit the emergency room for your asthma without being admitted to the hospital? 0 0 0   In the past 12 months, how many times were you hospitalized overnight because of your asthma? 0 0 0

## 2021-01-28 ENCOUNTER — TELEPHONE (OUTPATIENT)
Dept: FAMILY MEDICINE | Facility: CLINIC | Age: 36
End: 2021-01-28

## 2021-01-28 NOTE — LETTER
January 28, 2021      Krista Reddy  [address]  [facility] [place] [address]        Dear Krista,     Your healthcare team cares about your health. To provide you with the best care, we have reviewed your chart and based on our findings, we see that you are due for:   1.  An Asthma Control Test (ACT) that our clinic uses to monitor and manage your asthma. This test is an assessment tool that we use to determine how well your asthma is controlled.  Please complete the enclosed form and return in the provided envelope.  2.  Our records also indicate you are due for a pap for cervical cancer screening.  You can schedule this on "[employer] or by calling [phone].  If you have had this done at another facility please contact the clinic and we will update our records.  Thank you for choosing [facility] for your healthcare needs. For any questions, concerns, or to schedule an appointment please contact the clinic.   Healthy Regards,    Your [facility] Care Team

## 2021-02-01 NOTE — TELEPHONE ENCOUNTER
Patient due for Pap and HPV.    Reminder done today via telephone call - no answer. Unable to leave a vm.     Reminder done today via letter.

## 2021-02-25 NOTE — TELEPHONE ENCOUNTER
Patient Quality Outreach Summary      Summary:    Patient is due/failing the following:   ACT needed    Type of outreach:    2/25/21 ACT received and put into patient's chart    Questions for provider review:    None                                                                                                                    Valencia Shelton CMA ([facility]) 1:30 PM 2/25/2021         Chart routed to Provider (Shakila Reilly).

## 2021-02-26 ASSESSMENT — ASTHMA QUESTIONNAIRES: ACT_TOTALSCORE: 8

## 2021-03-08 ENCOUNTER — OFFICE VISIT (OUTPATIENT)
Dept: FAMILY MEDICINE | Facility: CLINIC | Age: 36
End: 2021-03-08
Payer: COMMERCIAL

## 2021-03-08 VITALS
SYSTOLIC BLOOD PRESSURE: 120 MMHG | HEIGHT: 60 IN | WEIGHT: 104 LBS | BODY MASS INDEX: 20.42 KG/M2 | DIASTOLIC BLOOD PRESSURE: 82 MMHG | TEMPERATURE: 99 F | OXYGEN SATURATION: 99 % | HEART RATE: 110 BPM | RESPIRATION RATE: 18 BRPM

## 2021-03-08 DIAGNOSIS — L02.31 ABSCESS AND CELLULITIS OF GLUTEAL REGION: Primary | ICD-10-CM

## 2021-03-08 DIAGNOSIS — L03.317 ABSCESS AND CELLULITIS OF GLUTEAL REGION: Primary | ICD-10-CM

## 2021-03-08 PROCEDURE — 99213 OFFICE O/P EST LOW 20 MIN: CPT | Performed by: NURSE PRACTITIONER

## 2021-03-08 RX ORDER — DOXYCYCLINE HYCLATE 100 MG
100 TABLET ORAL 2 TIMES DAILY
Qty: 20 TABLET | Refills: 0 | Status: SHIPPED | OUTPATIENT
Start: 2021-03-08 | End: 2021-03-18

## 2021-03-08 RX ORDER — CEPHALEXIN 500 MG/1
500 CAPSULE ORAL 3 TIMES DAILY
Qty: 30 CAPSULE | Refills: 0 | Status: SHIPPED | OUTPATIENT
Start: 2021-03-08 | End: 2021-03-18

## 2021-03-08 ASSESSMENT — MIFFLIN-ST. JEOR: SCORE: 1088.24

## 2021-03-08 NOTE — PROGRESS NOTES
"    Assessment & Plan     Abscess and cellulitis of gluteal region  4 total areas of induration and associated cellulitis to both buttocks and right posterior thigh. Nothing amenable to drainage today. Start antibiotics, Sitz baths. If not improving by Wednesday, she is to call. If fevers, worsening develops will need to be seen in ED.  - doxycycline hyclate (VIBRA-TABS) 100 MG tablet; Take 1 tablet (100 mg) by mouth 2 times daily for 10 days  - cephALEXin (KEFLEX) 500 MG capsule; Take 1 capsule (500 mg) by mouth 3 times daily for 10 days    6}     Patient Instructions   Take Keflex and doxycycline 3 and 2 times daily with food. Take a probiotic such as Culturelle or Florastor (recommend lactobacillus 50 billion CFU twice daily  by at least one hour from the antibiotic) while on the antibiotic or eat a Greek yogurt containing \"live active cultures\" daily.    Hot baths as we discussed.    If not improving by Wednesday let me know. If fevers develop, you need to be seen in ER right away.        Return in about 3 days (around 3/11/2021).    RAVI Grewal [facility] ZOEY Velasco is a 35 year old who presents for the following health issues     HPI       Rash  Onset/Duration: x 2-3 days ago  Description  Location: R side buttocks  Character: raised, painful, red  Itching: no  Intensity:  severe  Progression of Symptoms:  worsening  Accompanying signs and symptoms:   Fever: no  Body aches or joint pain: no  Sore throat symptoms: no  Recent cold symptoms: no  History:           Previous episodes of similar rash: 12/21/20  New exposures:  None  Recent travel: no  Exposure to similar rash: YES - in December  Precipitating or alleviating factors: warm  packs  Therapies tried and outcome: emuaid    Above HPI reviewed. Additionally, was seen in November and December for abscess to the buttocks. This developed over the past 3 days with new lesions appearing yesterday. Also " feels as another is developing near the vaginal introitus. No fevers. No drainage from any of the lesions.       Review of Systems   Constitutional, HEENT, cardiovascular, pulmonary, gi and gu systems are negative, except as otherwise noted.      Objective    /82 (BP Location: Right arm, Patient Position: Sitting, Cuff Size: Adult Large)   Pulse 110   Temp 99  F (37.2  C) (Tympanic)   Resp 18   Ht 1.524 m (5')   Wt 47.2 kg (104 lb)   LMP 03/01/2021 (Approximate)   SpO2 99%   Breastfeeding No   BMI 20.31 kg/m    Body mass index is 20.31 kg/m .  Physical Exam  Vitals signs and nursing note reviewed.   Constitutional:       General: She is not in acute distress.     Appearance: Normal appearance. She is not toxic-appearing.   HENT:      Head: Normocephalic and atraumatic.      Mouth/Throat:      Mouth: Mucous membranes are moist.   Neck:      Musculoskeletal: Neck supple.   Cardiovascular:      Rate and Rhythm: Normal rate.   Pulmonary:      Effort: Pulmonary effort is normal.   Skin:     General: Skin is warm and dry.      Comments: There is one area of induration of approximately 3cm to the left buttock with an overlying pustule. There is no fluctuance. There is approximately 3cm of surrounding erythema.  2 similar lesions to the right buttock, both without fluctuance and overlying pustules, both with surrounding cellulitis. There is a large area of induration to the right posterior thigh with a pustule overlying this. Again, no area of fluctuance amenable to drainage. There is approximately 7cm of surrounding cellulitis.   Neurological:      General: No focal deficit present.      Mental Status: She is alert.   Psychiatric:         Mood and Affect: Mood normal.         Behavior: Behavior normal.

## 2021-03-08 NOTE — PATIENT INSTRUCTIONS
"Take Keflex and doxycycline 3 and 2 times daily with food. Take a probiotic such as Culturelle or Florastor (recommend lactobacillus 50 billion CFU twice daily  by at least one hour from the antibiotic) while on the antibiotic or eat a Greek yogurt containing \"live active cultures\" daily.    Hot baths as we discussed.    If not improving by Wednesday let me know. If fevers develop, you need to be seen in ER right away.    "

## 2021-03-08 NOTE — LETTER
March 8, 2021      Krista Reddy  [address]  [facility] [place] [address]        To Whom It May Concern:    Krista Reddy  was seen on 3/8/2021.  Please excuse her  until 3/10/2021 due to illness.        Sincerely,        RAVI Grewal CNP

## 2021-03-10 ENCOUNTER — TELEPHONE (OUTPATIENT)
Dept: FAMILY MEDICINE | Facility: CLINIC | Age: 36
End: 2021-03-10

## 2021-03-10 NOTE — TELEPHONE ENCOUNTER
Routed to provider.    Pt updates that she is improving overall.  Two of the indurations are smaller now.  One is open and draining.  The perineal abscess is coming to a head.    Pt will continue sitz baths as directed.    Appt made for tomorrow for 3 day recheck as directed at 3/8 office visit.    Pt asks that a note to be off work be extended thru Friday, 3/12.  Fax, [phone], Attn Danielle Wiggins.    Chloe Chan RN

## 2021-03-10 NOTE — TELEPHONE ENCOUNTER
Reason for Call:  Other call back    Detailed comments: Patient is calling with update she said that 1 on her leg boil is drainage. Unsure how much it needs to drain for. She is asking the one in her private area it is fresh and has not opened yet.  She would like a updated note for work to be out the rest of the week.  Faxed- she will give fax number when called back.    She wants to know if Francine would like to see her?    Phone Number Patient can be reached at: Home number on file [phone] (home)    Best Time: any    Can we leave a detailed message on this number? YES    Call taken on 3/10/2021 at 8:28 AM by Cielo Medina

## 2021-03-12 ENCOUNTER — OFFICE VISIT (OUTPATIENT)
Dept: FAMILY MEDICINE | Facility: CLINIC | Age: 36
End: 2021-03-12
Payer: COMMERCIAL

## 2021-03-12 VITALS
WEIGHT: 106.4 LBS | DIASTOLIC BLOOD PRESSURE: 70 MMHG | RESPIRATION RATE: 17 BRPM | OXYGEN SATURATION: 99 % | BODY MASS INDEX: 20.89 KG/M2 | HEIGHT: 60 IN | SYSTOLIC BLOOD PRESSURE: 100 MMHG | TEMPERATURE: 99.1 F | HEART RATE: 108 BPM

## 2021-03-12 DIAGNOSIS — Z12.4 PAP SMEAR FOR CERVICAL CANCER SCREENING: ICD-10-CM

## 2021-03-12 DIAGNOSIS — L03.317 ABSCESS AND CELLULITIS OF GLUTEAL REGION: Primary | ICD-10-CM

## 2021-03-12 DIAGNOSIS — N89.8 VAGINAL DISCHARGE: ICD-10-CM

## 2021-03-12 DIAGNOSIS — L98.491 SKIN ULCERATION, LIMITED TO BREAKDOWN OF SKIN (H): ICD-10-CM

## 2021-03-12 DIAGNOSIS — L02.31 ABSCESS AND CELLULITIS OF GLUTEAL REGION: Primary | ICD-10-CM

## 2021-03-12 LAB
SPECIMEN SOURCE: NORMAL
WET PREP SPEC: NORMAL

## 2021-03-12 PROCEDURE — G0145 SCR C/V CYTO,THINLAYER,RESCR: HCPCS | Performed by: NURSE PRACTITIONER

## 2021-03-12 PROCEDURE — 87210 SMEAR WET MOUNT SALINE/INK: CPT | Performed by: NURSE PRACTITIONER

## 2021-03-12 PROCEDURE — 87624 HPV HI-RISK TYP POOLED RSLT: CPT | Performed by: NURSE PRACTITIONER

## 2021-03-12 PROCEDURE — 99214 OFFICE O/P EST MOD 30 MIN: CPT | Performed by: NURSE PRACTITIONER

## 2021-03-12 RX ORDER — SILVER SULFADIAZINE 10 MG/G
CREAM TOPICAL DAILY
Qty: 50 G | Refills: 1 | Status: SHIPPED | OUTPATIENT
Start: 2021-03-12 | End: 2022-02-04

## 2021-03-12 ASSESSMENT — MIFFLIN-ST. JEOR: SCORE: 1099.13

## 2021-03-12 NOTE — PROGRESS NOTES
Assessment & Plan     Abscess and cellulitis of gluteal region  Improved on antibiotics over the last 4 days.  She has one area that has ulcerated and has eschar tissue on it which is sore to pressure.  Uncertain of cause with this being significant area of the buttock and perineal so I have referred to dermatology in a week for evaluation.  - DERMATOLOGY ADULT REFERRAL.    Skin ulceration, limited to breakdown of skin (H)  Apply silvadene cream to open ulceration twice daily and cover until healed.  Recommend keeping the area clean. Continue oral antibiotic treatment and follow-up with dermatology in 1 week.    -silver sulfADIAZINE (SILVADENE) 1 % external cream; Apply topically daily Until sore heals over and scabs up    Vaginal discharge  Wet prep negative for yeast or clue cells.  Uncertain of discharge cause.  Follow-up if any burning or itching occurs for retesting since she is on antibiotics.  - Wet prep    Pap smear for cervical cancer screening  Screening PAP collected.  Patient will be notified of results.  - Pap imaged thin layer screen with HPV - recommended age 30 - 65 years (select HPV order below):     FURTHER TESTING:    Return in about 1 week (around 3/19/2021) for with Dermatology for evaluation.    Yumiko Spence NP  [facility] LEONARDO Velasco is a 35 year old who presents for the following health issues.    Concern - Cellulitis follow up   Onset: 3-6-2021   Description: one sore is opening and draining, the others are improving (See pictures below)  Intensity: moderate  Progression of Symptoms:  improving and constant  Accompanying Signs & Symptoms: not in pain as bad but the open sore is tender with pressure  Previous history of similar problem: none  Precipitating factors:        Worsened by: none  Alleviating factors:        Improved by: prescribed meds are making it better.   Therapies tried and outcome: Cephalexin and Doxycycline    Vaginal  Symptoms  Onset/Duration: 1 week   Description:  Vaginal Discharge: white curd-like   Itching (Pruritis): no  Burning sensation:  no  Odor: no  Accompanying Signs & Symptoms:  Urinary symptoms: no  Abdominal pain: no  Fever: no  History:   Sexually active: YES- boyfriend   New Partner: YES- 5 months   Possibility of Pregnancy:  no  Recent antibiotic use: YES  Previous vaginitis issues: YES- have had them in the past   Precipitating or alleviating factors: None  Therapies tried and outcome: Monistat on Sunday  Patient reported having a painful open area on her right buttocks.     Review of Systems   CONSTITUTIONAL:NEGATIVE for fever, chills, change in weight  : Reddened areas on buttocks, . Open area below right buttock that is tender to the touch; improvement seen since starting antibiotics 4 days ago  PSYCHIATRIC: NEGATIVE for changes in mood or affect      Objective    /70   Pulse 108   Temp 99.1  F (37.3  C) (Tympanic)   Resp 17   Ht 1.524 m (5')   Wt 48.3 kg (106 lb 6.4 oz)   LMP 03/01/2021 (Approximate)   SpO2 99%   BMI 20.78 kg/m    Body mass index is 20.78 kg/m .     Physical Exam   GENERAL: healthy, alert and no distress  SKIN:  Presence of multiple abscesses in perineum and buttocks that range from 0.5 in in diameter to 2 in in diameter with one open sore on lower right buttock that is tender to the touch and approximately the size of a dime with eschar tissue in the center with no drainage and normal healing process.  PSYCH: mentation appears normal, affect normal/bright                    Results for orders placed or performed in visit on 03/12/21 (from the past 24 hour(s))   Wet prep    Specimen: Vagina   Result Value Ref Range    Specimen Description Vagina     Wet Prep No clue cells seen     Wet Prep No yeast seen     Wet Prep No Trichomonas seen     Wet Prep Few  WBC'S seen

## 2021-03-12 NOTE — PATIENT INSTRUCTIONS
1.  Continue antibiotic treatment, keep these lesions clean, use silvadene cream to open wound twice daily and cover with bandaid.  Leave open when able.  As this closes and scabs you can discontinue.  2.  Follow-up with dermatology for evaluation in the next week when antibiotics are done.  3.  I have ordered PAP and will call you with results.  4.  Wet prep was negative for yeast, may just be a discharge change on antibiotics.  If you start having itching, follow-up in clinic.

## 2021-03-16 LAB
COPATH REPORT: NORMAL
PAP: NORMAL

## 2021-03-19 ENCOUNTER — PATIENT OUTREACH (OUTPATIENT)
Dept: FAMILY MEDICINE | Facility: CLINIC | Age: 36
End: 2021-03-19

## 2021-03-19 LAB
FINAL DIAGNOSIS: ABNORMAL
HPV HR 12 DNA CVX QL NAA+PROBE: POSITIVE
HPV16 DNA SPEC QL NAA+PROBE: NEGATIVE
HPV18 DNA SPEC QL NAA+PROBE: POSITIVE
SPECIMEN DESCRIPTION: ABNORMAL
SPECIMEN SOURCE CVX/VAG CYTO: ABNORMAL

## 2021-03-22 DIAGNOSIS — J45.30 MILD PERSISTENT ASTHMA WITHOUT COMPLICATION: ICD-10-CM

## 2021-03-22 RX ORDER — DEXAMETHASONE 4 MG/1
2 TABLET ORAL 2 TIMES DAILY
Qty: 12 G | Refills: 1 | Status: SHIPPED | OUTPATIENT
Start: 2021-03-22 | End: 2022-05-05

## 2021-05-10 ENCOUNTER — OFFICE VISIT (OUTPATIENT)
Dept: OBGYN | Facility: CLINIC | Age: 36
End: 2021-05-10
Payer: COMMERCIAL

## 2021-05-10 VITALS
HEART RATE: 104 BPM | BODY MASS INDEX: 20.51 KG/M2 | WEIGHT: 105 LBS | DIASTOLIC BLOOD PRESSURE: 89 MMHG | SYSTOLIC BLOOD PRESSURE: 132 MMHG | TEMPERATURE: 98 F

## 2021-05-10 DIAGNOSIS — R87.810 CERVICAL HIGH RISK HPV (HUMAN PAPILLOMAVIRUS) TEST POSITIVE: Primary | ICD-10-CM

## 2021-05-10 LAB — HCG UR QL: NEGATIVE

## 2021-05-10 PROCEDURE — 81025 URINE PREGNANCY TEST: CPT | Performed by: OBSTETRICS & GYNECOLOGY

## 2021-05-10 PROCEDURE — 88305 TISSUE EXAM BY PATHOLOGIST: CPT | Performed by: PATHOLOGY

## 2021-05-10 PROCEDURE — 57454 BX/CURETT OF CERVIX W/SCOPE: CPT | Performed by: OBSTETRICS & GYNECOLOGY

## 2021-05-10 NOTE — PROGRESS NOTES
[facility] Colpscopy Procedure Note    Krista Reddy  1985  [id number]    The patient was counseled on the risks (including including risk of infection, bleeding, recurrence), benefits, and alternatives of the procedure. Verbal and written consent were obtained.  A UPT was negative prior to the procedure.  2004 - 2011 NIL Paps (Care Everywhere)  5/22/14: ASCUS Pap, Neg HPV.  2/28/17: NIL Pap, + HR HPV (Neg 16/18). Plan cotest in 1 year.   3/29/18 ASCUS Pap, + HR HPV 16 & other HR HPV (not 18). Plan colp  4/24/18 [facility] Bx and ECC negative. Plan: Co-test in 1 year due by 4/24/19.  2/21/19 NIL Pap, + HR HPV 16 & other (neg 18). Plan colp  3/19/19 [facility] BX and ECC- Benign. Plan cotest in 1 year.  1/16/20 ASCUS pap with Neg HPV.  Plan cotest in 1 year.   1/4/21 Reminder letter  2/1/21 Reminder call - no answer, unable to leave a vm.   2/9/21 Reminder call - no answer, unable to leave a vm. Reminder letter  3/12/21 NIL pap, + HR HPV other and + HR HPV 18. Plan colp due bef 6/12/21    Technique: The patient was placed in the dorsal lithotomy position.  A coated speculum was placed in the vagina and the cervix visualized. Acetic acid was applied to the vagina, cervix, perineum and anus, and then visualized using the colposcope. Acetowhite staining was noted at 11,1  Lugol's solution was also placed on the cervix with decreased up take noted at same areas. The squamocolumnar junction and transformation zone were not fully visualized and colposcopy was unsatisfactory. Colposcopic impression: ELANA 1    Biopsies were taken at 11,1, 5. Endocervical curettage was performed. Silver nitrate to assure excellent hemostasis.  The patient tolerated the procedure well.  She was given post op instructions which included activity and pelvic restrictions.      A/P: Krista Reddy is s/p colposcopy procedure per ASCCP guidelines.   Will call or mychart to discuss results with patient.       Domitila Mooney MD   5/10/2021  1:39 PM

## 2021-05-10 NOTE — NURSING NOTE
Initial /89 (BP Location: Right arm, Patient Position: Sitting, Cuff Size: Adult Regular)   Pulse 104   Temp 98  F (36.7  C) (Tympanic)   Wt 47.6 kg (105 lb)   LMP 04/19/2021 (Approximate)   BMI 20.51 kg/m   Estimated body mass index is 20.51 kg/m  as calculated from the following:    Height as of 3/12/21: 1.524 m (5').    Weight as of this encounter: 47.6 kg (105 lb). .

## 2021-05-12 LAB — COPATH REPORT: NORMAL

## 2021-05-21 ENCOUNTER — RECORDS - HEALTHEAST (OUTPATIENT)
Dept: LAB | Facility: CLINIC | Age: 36
End: 2021-05-21

## 2021-05-21 LAB
SARS-COV-2 PCR COMMENT: NORMAL
SARS-COV-2 RNA SPEC QL NAA+PROBE: NEGATIVE
SARS-COV-2 VIRUS SPECIMEN SOURCE: NORMAL

## 2021-05-29 ENCOUNTER — HEALTH MAINTENANCE LETTER (OUTPATIENT)
Age: 36
End: 2021-05-29

## 2021-06-07 ENCOUNTER — PATIENT OUTREACH (OUTPATIENT)
Dept: FAMILY MEDICINE | Facility: CLINIC | Age: 36
End: 2021-06-07

## 2021-09-18 ENCOUNTER — HEALTH MAINTENANCE LETTER (OUTPATIENT)
Age: 36
End: 2021-09-18

## 2022-02-04 ENCOUNTER — OFFICE VISIT (OUTPATIENT)
Dept: FAMILY MEDICINE | Facility: CLINIC | Age: 37
End: 2022-02-04
Payer: COMMERCIAL

## 2022-02-04 VITALS
DIASTOLIC BLOOD PRESSURE: 88 MMHG | BODY MASS INDEX: 18.26 KG/M2 | HEART RATE: 88 BPM | RESPIRATION RATE: 20 BRPM | SYSTOLIC BLOOD PRESSURE: 123 MMHG | WEIGHT: 93 LBS | TEMPERATURE: 97.8 F | OXYGEN SATURATION: 100 % | HEIGHT: 60 IN

## 2022-02-04 DIAGNOSIS — Z11.3 SCREEN FOR STD (SEXUALLY TRANSMITTED DISEASE): ICD-10-CM

## 2022-02-04 DIAGNOSIS — Z30.013 ENCOUNTER FOR INITIAL PRESCRIPTION OF INJECTABLE CONTRACEPTIVE: Primary | ICD-10-CM

## 2022-02-04 LAB — HCG UR QL: NEGATIVE

## 2022-02-04 PROCEDURE — 96372 THER/PROPH/DIAG INJ SC/IM: CPT | Performed by: NURSE PRACTITIONER

## 2022-02-04 PROCEDURE — 87491 CHLMYD TRACH DNA AMP PROBE: CPT | Performed by: NURSE PRACTITIONER

## 2022-02-04 PROCEDURE — 99213 OFFICE O/P EST LOW 20 MIN: CPT | Mod: 25 | Performed by: NURSE PRACTITIONER

## 2022-02-04 PROCEDURE — 87591 N.GONORRHOEAE DNA AMP PROB: CPT | Performed by: NURSE PRACTITIONER

## 2022-02-04 PROCEDURE — 81025 URINE PREGNANCY TEST: CPT | Performed by: NURSE PRACTITIONER

## 2022-02-04 RX ORDER — MEDROXYPROGESTERONE ACETATE 150 MG/ML
150 INJECTION, SUSPENSION INTRAMUSCULAR
Status: COMPLETED | OUTPATIENT
Start: 2022-02-04 | End: 2022-10-20

## 2022-02-04 RX ADMIN — MEDROXYPROGESTERONE ACETATE 150 MG: 150 INJECTION, SUSPENSION INTRAMUSCULAR at 11:26

## 2022-02-04 ASSESSMENT — ASTHMA QUESTIONNAIRES
ACT_TOTALSCORE: 21
QUESTION_2 LAST FOUR WEEKS HOW OFTEN HAVE YOU HAD SHORTNESS OF BREATH: ONCE OR TWICE A WEEK
QUESTION_5 LAST FOUR WEEKS HOW WOULD YOU RATE YOUR ASTHMA CONTROL: WELL CONTROLLED
QUESTION_4 LAST FOUR WEEKS HOW OFTEN HAVE YOU USED YOUR RESCUE INHALER OR NEBULIZER MEDICATION (SUCH AS ALBUTEROL): TWO OR THREE TIMES PER WEEK
QUESTION_3 LAST FOUR WEEKS HOW OFTEN DID YOUR ASTHMA SYMPTOMS (WHEEZING, COUGHING, SHORTNESS OF BREATH, CHEST TIGHTNESS OR PAIN) WAKE YOU UP AT NIGHT OR EARLIER THAN USUAL IN THE MORNING: NOT AT ALL
ACT_TOTALSCORE: 21
QUESTION_1 LAST FOUR WEEKS HOW MUCH OF THE TIME DID YOUR ASTHMA KEEP YOU FROM GETTING AS MUCH DONE AT WORK, SCHOOL OR AT HOME: NONE OF THE TIME

## 2022-02-04 ASSESSMENT — MIFFLIN-ST. JEOR: SCORE: 1033.35

## 2022-02-04 NOTE — PROGRESS NOTES
Assessment & Plan     Encounter for initial prescription of injectable contraceptive  - medroxyPROGESTERone (DEPO-PROVERA) injection 150 mg  - HCG Qual, Urine ([id number])    Screen for STD (sexually transmitted disease)  - NEISSERIA GONORRHOEA PCR  - CHLAMYDIA TRACHOMATIS PCR      The risks, benefits and treatment options of prescribed medications or other treatments have been discussed with the patient. The patient verbalized their understanding and should call or follow up if no improvement or if they develop further problems.    RAVI Smith CNP  [facility] is a 36 year old who presents for the following health issues     HPI     Chief Complaint   Patient presents with     Contraception     patient wants to get back on depo      lmp- 1/31/22    Has used in the past and it worked well for her.  No side effects    Sexually active.  Using condoms every time.          Review of Systems   Constitutional, HEENT, cardiovascular, pulmonary, gi and gu systems are negative, except as otherwise noted.      Objective    /88   Pulse 88   Temp 97.8  F (36.6  C) (Tympanic)   Resp 20   Ht 1.524 m (5')   Wt 42.2 kg (93 lb)   LMP 01/31/2022   SpO2 100%   Breastfeeding No   BMI 18.16 kg/m    Body mass index is 18.16 kg/m .  Physical Exam   GENERAL: healthy, alert and no distress  PSYCH: mentation appears normal, affect normal/bright    Results for orders placed or performed in visit on 02/04/22 (from the past 24 hour(s))   HCG Qual, Urine ([id number])   Result Value Ref Range    hCG Urine Qualitative Negative Negative

## 2022-02-04 NOTE — NURSING NOTE
Clinic Administered Medication Documentation    Administrations This Visit     medroxyPROGESTERone (DEPO-PROVERA) injection 150 mg     Admin Date  02/04/2022 Action  Given Dose  150 mg Route  Intramuscular Site  Right Ventrogluteal Administered By  Silvia Reyes CMA    Ordering Provider: Shakila Reilly APRN CNP    Patient Supplied?: No                  Depo Provera Documentation    URINE HCG: negative    Depo-Provera Standing Order inclusion/exclusion criteria reviewed.   Patient meets: inclusion criteria     BP: 123/88  LAST PAP/EXAM:   Lab Results   Component Value Date    PAP NIL 03/12/2021       Prior to injection, verified patient identity using patient's name and date of birth. Medication was administered. Please see MAR and medication order for additional information.     Was entire vial of medication used? Yes  Vial/Syringe: Single dose vial  Expiration Date:  9/2023    Patient instructed to remain in clinic for 15 minutes.  NEXT INJECTION DUE: 4/22/22 - 5/6/22

## 2022-02-05 LAB
C TRACH DNA SPEC QL NAA+PROBE: NEGATIVE
N GONORRHOEA DNA SPEC QL NAA+PROBE: NEGATIVE

## 2022-02-27 ENCOUNTER — HEALTH MAINTENANCE LETTER (OUTPATIENT)
Age: 37
End: 2022-02-27

## 2022-04-19 ENCOUNTER — PATIENT OUTREACH (OUTPATIENT)
Dept: FAMILY MEDICINE | Facility: CLINIC | Age: 37
End: 2022-04-19
Payer: COMMERCIAL

## 2022-04-19 NOTE — LETTER
April 19, 2022      Krista Reddy  [address]  [facility] [place] [address]        Dear ,    This letter is to remind you that you are due for your follow-up Pap smear and Human Papillomavirus (HPV) test.    Please call [phone] to schedule your appointment at your earliest convenience.    If you have completed the appointment outside of the [facility] system, please have the records forwarded to our office. We will update your chart for your provider to review before your next annual wellness visit.     Thank you for choosing [facility]!      Sincerely,    Your [facility] Care Team

## 2022-05-05 ENCOUNTER — OFFICE VISIT (OUTPATIENT)
Dept: FAMILY MEDICINE | Facility: CLINIC | Age: 37
End: 2022-05-05
Payer: COMMERCIAL

## 2022-05-05 VITALS
HEART RATE: 90 BPM | SYSTOLIC BLOOD PRESSURE: 138 MMHG | WEIGHT: 101 LBS | HEIGHT: 60 IN | DIASTOLIC BLOOD PRESSURE: 80 MMHG | TEMPERATURE: 98.2 F | BODY MASS INDEX: 19.83 KG/M2 | RESPIRATION RATE: 12 BRPM | OXYGEN SATURATION: 100 %

## 2022-05-05 DIAGNOSIS — R09.81 CONGESTION OF PARANASAL SINUS: ICD-10-CM

## 2022-05-05 DIAGNOSIS — J45.30 MILD PERSISTENT ASTHMA WITHOUT COMPLICATION: ICD-10-CM

## 2022-05-05 DIAGNOSIS — K21.9 GASTROESOPHAGEAL REFLUX DISEASE WITHOUT ESOPHAGITIS: ICD-10-CM

## 2022-05-05 DIAGNOSIS — Z01.419 ENCOUNTER FOR GYNECOLOGICAL EXAMINATION WITHOUT ABNORMAL FINDING: Primary | ICD-10-CM

## 2022-05-05 DIAGNOSIS — F41.9 ANXIETY: ICD-10-CM

## 2022-05-05 PROCEDURE — G0145 SCR C/V CYTO,THINLAYER,RESCR: HCPCS | Performed by: NURSE PRACTITIONER

## 2022-05-05 PROCEDURE — 99395 PREV VISIT EST AGE 18-39: CPT | Mod: 25 | Performed by: NURSE PRACTITIONER

## 2022-05-05 PROCEDURE — 99214 OFFICE O/P EST MOD 30 MIN: CPT | Mod: 25 | Performed by: NURSE PRACTITIONER

## 2022-05-05 PROCEDURE — 96372 THER/PROPH/DIAG INJ SC/IM: CPT | Performed by: NURSE PRACTITIONER

## 2022-05-05 PROCEDURE — 87624 HPV HI-RISK TYP POOLED RSLT: CPT | Performed by: NURSE PRACTITIONER

## 2022-05-05 RX ORDER — DEXAMETHASONE 4 MG/1
2 TABLET ORAL 2 TIMES DAILY
Qty: 12 G | Refills: 11 | Status: SHIPPED | OUTPATIENT
Start: 2022-05-05 | End: 2023-09-13

## 2022-05-05 RX ORDER — FAMOTIDINE 20 MG/1
20 TABLET, FILM COATED ORAL 2 TIMES DAILY
Qty: 180 TABLET | Refills: 3 | Status: SHIPPED | OUTPATIENT
Start: 2022-05-05 | End: 2024-08-09

## 2022-05-05 RX ORDER — FLUTICASONE PROPIONATE 50 MCG
1 SPRAY, SUSPENSION (ML) NASAL DAILY
Qty: 16 G | Refills: 11 | Status: SHIPPED | OUTPATIENT
Start: 2022-05-05 | End: 2024-08-09

## 2022-05-05 RX ORDER — ALBUTEROL SULFATE 90 UG/1
2 AEROSOL, METERED RESPIRATORY (INHALATION) EVERY 6 HOURS PRN
Qty: 18 G | Refills: 11 | Status: SHIPPED | OUTPATIENT
Start: 2022-05-05 | End: 2023-09-13

## 2022-05-05 RX ORDER — ESCITALOPRAM OXALATE 10 MG/1
10 TABLET ORAL DAILY
Qty: 30 TABLET | Refills: 1 | Status: SHIPPED | OUTPATIENT
Start: 2022-05-05 | End: 2023-09-13

## 2022-05-05 RX ORDER — LEVONORGESTREL/ETHIN.ESTRADIOL 0.1-0.02MG
1 TABLET ORAL DAILY
Qty: 84 TABLET | Refills: 4 | Status: CANCELLED | OUTPATIENT
Start: 2022-05-05

## 2022-05-05 RX ADMIN — MEDROXYPROGESTERONE ACETATE 150 MG: 150 INJECTION, SUSPENSION INTRAMUSCULAR at 08:22

## 2022-05-05 ASSESSMENT — ANXIETY QUESTIONNAIRES
GAD7 TOTAL SCORE: 13
GAD7 TOTAL SCORE: 13
8. IF YOU CHECKED OFF ANY PROBLEMS, HOW DIFFICULT HAVE THESE MADE IT FOR YOU TO DO YOUR WORK, TAKE CARE OF THINGS AT HOME, OR GET ALONG WITH OTHER PEOPLE?: EXTREMELY DIFFICULT
GAD7 TOTAL SCORE: 13
2. NOT BEING ABLE TO STOP OR CONTROL WORRYING: NEARLY EVERY DAY
5. BEING SO RESTLESS THAT IT IS HARD TO SIT STILL: SEVERAL DAYS
1. FEELING NERVOUS, ANXIOUS, OR ON EDGE: MORE THAN HALF THE DAYS
4. TROUBLE RELAXING: MORE THAN HALF THE DAYS
3. WORRYING TOO MUCH ABOUT DIFFERENT THINGS: MORE THAN HALF THE DAYS
7. FEELING AFRAID AS IF SOMETHING AWFUL MIGHT HAPPEN: SEVERAL DAYS
6. BECOMING EASILY ANNOYED OR IRRITABLE: MORE THAN HALF THE DAYS
7. FEELING AFRAID AS IF SOMETHING AWFUL MIGHT HAPPEN: SEVERAL DAYS

## 2022-05-05 ASSESSMENT — ENCOUNTER SYMPTOMS
HEADACHES: 0
ARTHRALGIAS: 0
MYALGIAS: 0
JOINT SWELLING: 0
CHILLS: 0
EYE PAIN: 0
PALPITATIONS: 0
SHORTNESS OF BREATH: 0
COUGH: 0
ABDOMINAL PAIN: 0
DYSURIA: 0
DIARRHEA: 0
CONSTIPATION: 1
FEVER: 0
NAUSEA: 0
SORE THROAT: 0
HEMATURIA: 0
HEMATOCHEZIA: 0
DIZZINESS: 0
PARESTHESIAS: 0
HEARTBURN: 0
NERVOUS/ANXIOUS: 1
FREQUENCY: 0
WEAKNESS: 0
BREAST MASS: 0

## 2022-05-05 ASSESSMENT — PAIN SCALES - GENERAL: PAINLEVEL: NO PAIN (0)

## 2022-05-05 ASSESSMENT — ASTHMA QUESTIONNAIRES: ACT_TOTALSCORE: 22
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
## 2022-05-05 NOTE — NURSING NOTE
Clinic Administered Medication Documentation    Administrations This Visit     medroxyPROGESTERone (DEPO-PROVERA) injection 150 mg     Admin Date  05/05/2022 Action  Given Dose  150 mg Route  Intramuscular Site  Left Ventrogluteal Administered By  Silvia Reyes CMA    Ordering Provider: Shakila Reilly APRN CNP    Patient Supplied?: No                  Depo Provera Documentation    URINE HCG: not indicated    Depo-Provera Standing Order inclusion/exclusion criteria reviewed.   Patient meets: inclusion criteria     BP: 138/80  LAST PAP/EXAM:   Lab Results   Component Value Date    PAP NIL 03/12/2021       Prior to injection, verified patient identity using patient's name and date of birth. Medication was administered. Please see MAR and medication order for additional information.     Was entire vial of medication used? Yes  Vial/Syringe: Single dose vial  Expiration Date:  9/2023    Patient instructed to report any adverse reaction to staff immediately .  NEXT INJECTION DUE: 7/21/22 - 8/4/22

## 2022-05-06 ASSESSMENT — ANXIETY QUESTIONNAIRES: GAD7 TOTAL SCORE: 13

## 2022-05-09 LAB
BKR LAB AP GYN ADEQUACY: NORMAL
BKR LAB AP GYN INTERPRETATION: NORMAL
BKR LAB AP HPV REFLEX: NORMAL
BKR LAB AP PREVIOUS ABNL DX: NORMAL
BKR LAB AP PREVIOUS ABNORMAL: NORMAL
PATH REPORT.COMMENTS IMP SPEC: NORMAL
PATH REPORT.COMMENTS IMP SPEC: NORMAL
PATH REPORT.RELEVANT HX SPEC: NORMAL

## 2022-05-11 LAB
HUMAN PAPILLOMA VIRUS 16 DNA: NEGATIVE
HUMAN PAPILLOMA VIRUS 18 DNA: POSITIVE
HUMAN PAPILLOMA VIRUS FINAL DIAGNOSIS: ABNORMAL
HUMAN PAPILLOMA VIRUS OTHER HR: NEGATIVE

## 2022-05-18 ENCOUNTER — PATIENT OUTREACH (OUTPATIENT)
Dept: FAMILY MEDICINE | Facility: CLINIC | Age: 37
End: 2022-05-18
Payer: COMMERCIAL

## 2022-05-18 DIAGNOSIS — R87.810 CERVICAL HIGH RISK HPV (HUMAN PAPILLOMAVIRUS) TEST POSITIVE: ICD-10-CM

## 2022-05-18 NOTE — LETTER
October 24, 2022      Krista Reddy  [address]  [facility] [facility] [place] [address]        Dear ,    At [facility], your health and wellness are our primary concern. That is why we are following up on your most recent positive high-risk Human Papillomavirus (HPV) test.    Please call [phone] to schedule an appointment for your recommended follow-up Colposcopy (this cannot be scheduled through [facility]) at your earliest convenience. If you have chosen not to do the recommended colposcopy, please contact your clinic to schedule an appointment for a repeat Pap smear and Human Papillomavirus (HPV) test at this time.    If you have completed the appointment outside of the [facility] system, please have the records forwarded to our office. We will update your chart for your provider to review before your next annual wellness visit.     Thank you for choosing [facility]!      Sincerely,    Your [facility] Care Team

## 2022-05-18 NOTE — LETTER
November 21, 2022      Krista Reddy  [address]  [place] [facility] [place] [address]        Dear ,    At [facility], your health and wellness are our primary concern. That is why we are following up on your most recent positive high-risk Human Papillomavirus (HPV) test.    Please call [phone] option [phone] to schedule an appointment for your recommended follow-up Colposcopy ([facility]) at your earliest convenience. If you have chosen not to do the recommended colposcopy, please contact your clinic to schedule an appointment for a repeat Pap smear and Human Papillomavirus (HPV) test at this time.    If you have completed the appointment outside of the [facility] system, please have the records forwarded to our office. We will update your chart for your provider to review before your next annual wellness visit.     Thank you for choosing [facility]!      Sincerely,    Your [facility] Care Team

## 2022-06-28 NOTE — TELEPHONE ENCOUNTER
Patient due for Colposcopy.    Please send result letter dated 5/16/22 as certified. Thanks!    Flavia Pichardo RN BSN, Pap Tracking

## 2022-07-05 ENCOUNTER — TELEPHONE (OUTPATIENT)
Dept: OBGYN | Facility: CLINIC | Age: 37
End: 2022-07-05

## 2022-07-05 NOTE — TELEPHONE ENCOUNTER
Patient needs to schedule an colposcopy with a OB/GYN Provider at the [place] location. Please call her at [phone] to help her schedule.  Cuca Cunningham   [facility]  Central Scheduler

## 2022-08-02 ENCOUNTER — ALLIED HEALTH/NURSE VISIT (OUTPATIENT)
Dept: FAMILY MEDICINE | Facility: CLINIC | Age: 37
End: 2022-08-02
Payer: COMMERCIAL

## 2022-08-02 VITALS — DIASTOLIC BLOOD PRESSURE: 78 MMHG | BODY MASS INDEX: 20.27 KG/M2 | SYSTOLIC BLOOD PRESSURE: 122 MMHG | WEIGHT: 103.8 LBS

## 2022-08-02 DIAGNOSIS — Z30.9 CONTRACEPTIVE MANAGEMENT: Primary | ICD-10-CM

## 2022-08-02 PROCEDURE — 99207 PR NO CHARGE NURSE ONLY: CPT

## 2022-08-02 PROCEDURE — 96372 THER/PROPH/DIAG INJ SC/IM: CPT | Performed by: NURSE PRACTITIONER

## 2022-08-02 RX ADMIN — MEDROXYPROGESTERONE ACETATE 150 MG: 150 INJECTION, SUSPENSION INTRAMUSCULAR at 14:51

## 2022-08-02 NOTE — PROGRESS NOTES
Clinic Administered Medication Documentation    Administrations This Visit     medroxyPROGESTERone (DEPO-PROVERA) injection 150 mg     Admin Date  08/02/2022 Action  Given Dose  150 mg Route  Intramuscular Site   Administered By  Miranda Harmon CMA    Ordering Provider: Shakila Reilly APRN CNP    Patient Supplied?: No                  Depo Provera Documentation    URINE HCG: not indicated    Depo-Provera Standing Order inclusion/exclusion criteria reviewed.   Patient meets: inclusion criteria     BP: 122/78  LAST PAP/EXAM:   Lab Results   Component Value Date    PAP NIL 03/12/2021       Prior to injection, verified patient identity using patient's name and date of birth. Medication was administered. Please see MAR and medication order for additional information.     Was entire vial of medication used? Yes  Vial/Syringe: Single dose vial  Expiration Date:  1/2024    Patient instructed to remain in clinic for 15 minutes.  NEXT INJECTION DUE: 10/18/22 - 11/1/22

## 2022-10-20 ENCOUNTER — ALLIED HEALTH/NURSE VISIT (OUTPATIENT)
Dept: FAMILY MEDICINE | Facility: CLINIC | Age: 37
End: 2022-10-20
Payer: COMMERCIAL

## 2022-10-20 VITALS — DIASTOLIC BLOOD PRESSURE: 80 MMHG | SYSTOLIC BLOOD PRESSURE: 134 MMHG

## 2022-10-20 DIAGNOSIS — Z30.9 CONTRACEPTIVE MANAGEMENT: Primary | ICD-10-CM

## 2022-10-20 PROCEDURE — 99207 PR NO CHARGE NURSE ONLY: CPT

## 2022-10-20 PROCEDURE — 96372 THER/PROPH/DIAG INJ SC/IM: CPT | Performed by: NURSE PRACTITIONER

## 2022-10-20 RX ADMIN — MEDROXYPROGESTERONE ACETATE 150 MG: 150 INJECTION, SUSPENSION INTRAMUSCULAR at 10:52

## 2022-10-20 NOTE — PROGRESS NOTES
Clinic Administered Medication Documentation          Depo Provera Documentation    URINE HCG: not indicated    Depo-Provera Standing Order inclusion/exclusion criteria reviewed.   Patient meets: inclusion criteria     BP: Data Unavailable  LAST PAP/EXAM:   Lab Results   Component Value Date    PAP NIL 03/12/2021       Prior to injection, verified patient identity using patient's name and date of birth. Medication was administered. Please see MAR and medication order for additional information.     Was entire vial of medication used? Yes  Vial/Syringe: Single dose vial  Expiration Date:  04/30/2024    Patient instructed to remain in clinic for 15 minutes.  NEXT INJECTION DUE: 1/5/23 - 1/19/23

## 2022-11-19 ENCOUNTER — HEALTH MAINTENANCE LETTER (OUTPATIENT)
Age: 37
End: 2022-11-19

## 2022-11-21 NOTE — TELEPHONE ENCOUNTER
Patient due for Colposcopy.    Reminder done today via letter and telephone call.  Women answered and stated it was wrong number when asked for pt. Number matched that on demographics. Final letter sent

## 2022-12-19 NOTE — TELEPHONE ENCOUNTER
"  FYI to provider - Patient is lost to pap tracking follow-up. Attempts to contact pt have been made per reminder process and there has been no reply and/or no appt scheduled.       Flavia Swift RN, BSN      2004 - 2011 NIL Paps (Care Everywhere)  5/22/14 ASCUS Pap, Neg HPV.  2/28/17 NIL Pap, + HR HPV (Neg 16/18). Plan cotest in 1 year.   3/29/18 ASCUS Pap, + HR HPV 16 & other HR HPV (not 18). Plan colp  4/24/18 [place] Bx and ECC negative. Plan: Co-test in 1 year due by 4/24/19.  2/21/19 NIL Pap, + HR HPV 16 & other (neg 18). Plan colp  3/19/19 [place] BX and ECC- Benign. Plan cotest in 1 year.  1/16/20 ASCUS pap with Neg HPV.  Plan cotest in 1 year.  3/12/21 NIL pap, + HR HPV 18 & other. Plan colp due bef 6/12/21.   5/10/21 Colpo No dysplasia. ECC- Non diagnostic. Plan cotest in 1 year   5/5/22 NIL pap, +HR HPV 18. Plan: colposcopy due before 8/5/22 5/12/22 \"your call cannot be completed at this time\" / mychart sent  5/13/22 \"your call cannot be completed at this time\"  5/16/22 Result letter sent  6/28/22 Certified ltr sent. Tracking # [id number]  8/17/22 [place] appt -- no show  9/13/22 [place] not done. Tracking updated for 6 mo colp/pap due 11/5/22.    10/24/22 Reminder letter  11/21/22 Reminder done today via letter and telephone call.  Women answered and stated it was wrong number when asked for pt. Number matched that on demographics. Final letter sent  12/19/22 Lost to follow up    "

## 2023-01-04 ENCOUNTER — ALLIED HEALTH/NURSE VISIT (OUTPATIENT)
Dept: FAMILY MEDICINE | Facility: CLINIC | Age: 38
End: 2023-01-04
Payer: COMMERCIAL

## 2023-01-04 DIAGNOSIS — Z30.9 CONTRACEPTIVE MANAGEMENT: Primary | ICD-10-CM

## 2023-01-04 PROCEDURE — 99207 PR NO CHARGE NURSE ONLY: CPT

## 2023-01-04 PROCEDURE — 96372 THER/PROPH/DIAG INJ SC/IM: CPT | Performed by: NURSE PRACTITIONER

## 2023-01-04 RX ORDER — MEDROXYPROGESTERONE ACETATE 150 MG/ML
150 INJECTION, SUSPENSION INTRAMUSCULAR
Status: ACTIVE | OUTPATIENT
Start: 2023-01-04 | End: 2023-12-30

## 2023-01-04 RX ADMIN — MEDROXYPROGESTERONE ACETATE 150 MG: 150 INJECTION, SUSPENSION INTRAMUSCULAR at 13:13

## 2023-01-04 NOTE — PROGRESS NOTES
BP: Data Unavailable    LAST PAP/EXAM:   Lab Results   Component Value Date    PAP NIL 03/12/2021     URINE HCG:not indicated    The following medication was given:     MEDICATION: Depo Provera 150mg  ROUTE: IM  [facility]  : [employer]   LOT #: [id number]  EXP:4/30/24  NEXT INJECTION DUE: 3/22/23 - 4/5/23   Provider: Melba

## 2023-03-22 ENCOUNTER — ALLIED HEALTH/NURSE VISIT (OUTPATIENT)
Dept: FAMILY MEDICINE | Facility: CLINIC | Age: 38
End: 2023-03-22
Payer: COMMERCIAL

## 2023-03-22 VITALS — SYSTOLIC BLOOD PRESSURE: 124 MMHG | DIASTOLIC BLOOD PRESSURE: 84 MMHG

## 2023-03-22 DIAGNOSIS — Z30.42 ENCOUNTER FOR SURVEILLANCE OF INJECTABLE CONTRACEPTIVE: Primary | ICD-10-CM

## 2023-03-22 PROCEDURE — 99207 PR NO CHARGE NURSE ONLY: CPT

## 2023-03-22 PROCEDURE — 96372 THER/PROPH/DIAG INJ SC/IM: CPT | Performed by: NURSE PRACTITIONER

## 2023-03-22 RX ADMIN — MEDROXYPROGESTERONE ACETATE 150 MG: 150 INJECTION, SUSPENSION INTRAMUSCULAR at 11:25

## 2023-03-22 NOTE — PROGRESS NOTES
Clinic Administered Medication Documentation      Depo Provera Documentation    Depo-Provera Standing Order inclusion/exclusion criteria reviewed.     Is this the initial or subsequent dose of Depo Provera? Subsequent dose - patient is within the acceptable window of time (11-15 weeks) for subsequent injection. Pregnancy test not indicated.    Patient meets: inclusion criteria     Is there an active order (written within the past 365 days, with administrations remaining, not ) in the chart? Yes.     Prior to injection, verified patient identity using patient's name and date of birth. Medication was administered. Please see MAR and medication order for additional information.     Vial/Syringe: Single dose vial. Was entire vial of medication used? Yes    Patient instructed to remain in clinic for 15 minutes and report any adverse reaction to staff immediately.  NEXT INJECTION DUE: 23 - 23    Verified that the patient has refills remaining in their prescription.

## 2023-05-04 ENCOUNTER — PATIENT OUTREACH (OUTPATIENT)
Dept: CARE COORDINATION | Facility: CLINIC | Age: 38
End: 2023-05-04
Payer: COMMERCIAL

## 2023-05-18 ENCOUNTER — PATIENT OUTREACH (OUTPATIENT)
Dept: CARE COORDINATION | Facility: CLINIC | Age: 38
End: 2023-05-18
Payer: COMMERCIAL

## 2023-05-23 ENCOUNTER — OFFICE VISIT (OUTPATIENT)
Dept: FAMILY MEDICINE | Facility: CLINIC | Age: 38
End: 2023-05-23
Payer: COMMERCIAL

## 2023-05-23 VITALS
RESPIRATION RATE: 16 BRPM | TEMPERATURE: 98.4 F | BODY MASS INDEX: 21.6 KG/M2 | DIASTOLIC BLOOD PRESSURE: 60 MMHG | OXYGEN SATURATION: 94 % | HEIGHT: 60 IN | HEART RATE: 105 BPM | SYSTOLIC BLOOD PRESSURE: 120 MMHG | WEIGHT: 110 LBS

## 2023-05-23 DIAGNOSIS — J45.901 EXACERBATION OF ASTHMA, UNSPECIFIED ASTHMA SEVERITY, UNSPECIFIED WHETHER PERSISTENT: Primary | ICD-10-CM

## 2023-05-23 PROCEDURE — 99213 OFFICE O/P EST LOW 20 MIN: CPT | Performed by: NURSE PRACTITIONER

## 2023-05-23 RX ORDER — FLUTICASONE PROPIONATE 110 UG/1
2 AEROSOL, METERED RESPIRATORY (INHALATION) 2 TIMES DAILY
Qty: 12 G | Refills: 11 | Status: CANCELLED | OUTPATIENT
Start: 2023-05-23

## 2023-05-23 RX ORDER — FLUTICASONE PROPIONATE 50 MCG
1 SPRAY, SUSPENSION (ML) NASAL DAILY
Qty: 16 G | Refills: 11 | Status: CANCELLED | OUTPATIENT
Start: 2023-05-23

## 2023-05-23 RX ORDER — AZITHROMYCIN 250 MG/1
TABLET, FILM COATED ORAL
Qty: 6 TABLET | Refills: 0 | Status: SHIPPED | OUTPATIENT
Start: 2023-05-23 | End: 2023-05-28

## 2023-05-23 RX ORDER — PREDNISONE 20 MG/1
20 TABLET ORAL DAILY
Qty: 5 TABLET | Refills: 0 | Status: SHIPPED | OUTPATIENT
Start: 2023-05-23 | End: 2023-09-13

## 2023-05-23 RX ORDER — ALBUTEROL SULFATE 90 UG/1
2 AEROSOL, METERED RESPIRATORY (INHALATION) EVERY 6 HOURS PRN
Qty: 18 G | Refills: 11 | Status: CANCELLED | OUTPATIENT
Start: 2023-05-23

## 2023-05-23 ASSESSMENT — ASTHMA QUESTIONNAIRES
QUESTION_5 LAST FOUR WEEKS HOW WOULD YOU RATE YOUR ASTHMA CONTROL: POORLY CONTROLLED
ACT_TOTALSCORE: 17
QUESTION_2 LAST FOUR WEEKS HOW OFTEN HAVE YOU HAD SHORTNESS OF BREATH: THREE TO SIX TIMES A WEEK
ACT_TOTALSCORE: 17
QUESTION_1 LAST FOUR WEEKS HOW MUCH OF THE TIME DID YOUR ASTHMA KEEP YOU FROM GETTING AS MUCH DONE AT WORK, SCHOOL OR AT HOME: A LITTLE OF THE TIME
QUESTION_4 LAST FOUR WEEKS HOW OFTEN HAVE YOU USED YOUR RESCUE INHALER OR NEBULIZER MEDICATION (SUCH AS ALBUTEROL): TWO OR THREE TIMES PER WEEK
QUESTION_3 LAST FOUR WEEKS HOW OFTEN DID YOUR ASTHMA SYMPTOMS (WHEEZING, COUGHING, SHORTNESS OF BREATH, CHEST TIGHTNESS OR PAIN) WAKE YOU UP AT NIGHT OR EARLIER THAN USUAL IN THE MORNING: NOT AT ALL

## 2023-05-23 NOTE — PATIENT INSTRUCTIONS
Continue with Albuterol, Flovent and Flonase.  Try the antibiotic and Prednisone for cough.  Warm and ice to area of rib pain.  Follow up if any worsening.      Our Clinic hours are:  Mondays    7:20 am - 7 pm  Tues -  Fri  7:20 am - 5 pm    Clinic Phone: [phone]    The clinic lab opens at 7:30 am Mon - Fri and appointments are required.    [facility]  Ph. [phone]  Monday  8 am - 7pm  Tues - Fri 8 am - 5:30 pm

## 2023-05-23 NOTE — PROGRESS NOTES
Assessment & Plan     Exacerbation of asthma, unspecified asthma severity, unspecified whether persistent    - azithromycin (ZITHROMAX) 250 MG tablet; Take 2 tablets (500 mg) by mouth daily for 1 day, THEN 1 tablet (250 mg) daily for 4 days.  - predniSONE (DELTASONE) 20 MG tablet; Take 1 tablet (20 mg) by mouth daily  Discussed how to take the medication(s), expected outcomes, potential side effects.             See Patient Instructions  Patient Instructions   Continue with Albuterol, Flovent and Flonase.  Try the antibiotic and Prednisone for cough.  Warm and ice to area of rib pain.  Follow up if any worsening.      Our Clinic hours are:  Mondays    7:20 am - 7 pm  Tues - Fri  7:20 am - 5 pm    Clinic Phone: [phone]    The clinic lab opens at 7:30 am Mon - Fri and appointments are required.    [facility]. [phone]  Monday  8 am - 7pm  Tues - Fri 8 am - 5:30 pm           RAVI [facility]    Gary Velasco is a 37 year old, presenting for the following health issues:  URI        5/23/2023    12:56 PM   Additional Questions   Roomed by      HPI     Asthma Follow-Up    Was ACT completed today?  Yes        5/23/2023    12:59 PM   ACT Total Scores   ACT TOTAL SCORE (Goal Greater than or Equal to 20) 17   In the past 12 months, how many times did you visit the emergency room for your asthma without being admitted to the hospital? 0   In the past 12 months, how many times were you hospitalized overnight because of your asthma? 0         How many days per week do you miss taking your asthma controller medication?  0    Please describe any recent triggers for your asthma: smoke, upper respiratory infections, pollens and humidity    Have you had any Emergency Room Visits, Urgent Care Visits, or Hospital Admissions since your last office visit?  No      How many servings of fruits and vegetables do you eat daily?  2-3    On average,  how many sweetened beverages do you drink each day (Examples: soda, juice, sweet tea, etc.  Do NOT count diet or artificially sweetened beverages)?   3    How many days per week do you exercise enough to make your heart beat faster? 3 or less    How many minutes a day do you exercise enough to make your heart beat faster? 20 - 29    How many days per week do you miss taking your medication? 0    Current Outpatient Medications   Medication     albuterol (PROAIR HFA/PROVENTIL HFA/VENTOLIN HFA) 108 (90 Base) MCG/ACT inhaler     azithromycin (ZITHROMAX) 250 MG tablet     cetirizine (ZYRTEC) 10 MG tablet     escitalopram (LEXAPRO) 10 MG tablet     famotidine (PEPCID) 20 MG tablet     fluticasone (FLONASE) 50 MCG/ACT nasal spray     fluticasone (FLOVENT HFA) 110 MCG/ACT inhaler     predniSONE (DELTASONE) 20 MG tablet     Current Facility-Administered Medications   Medication     medroxyPROGESTERone (DEPO-PROVERA) injection 150 mg     Past Medical History:   Diagnosis Date     Abnormal Pap smear of cervix 05/2014, 3/29/18, 1/16/20    See Problem List     Cervical high risk HPV (human papillomavirus) test positive     02/28/17, 3/29/18, 2/21/19, 3/12/21           Review of Systems   Constitutional, HEENT, cardiovascular, pulmonary, gi and gu systems are negative, except as otherwise noted.      Objective    /60   Pulse 105   Temp 98.4  F (36.9  C)   Resp 16   Ht 1.524 m (5')   Wt 49.9 kg (110 lb)   SpO2 94%   BMI 21.48 kg/m    Body mass index is 21.48 kg/m .  Physical Exam   GENERAL: healthy, alert and no distress  HENT: ear canals mild cerumen on left and TM's normal, pharynx without erythema  NECK: no adenopathy, no asymmetry  RESP: lungs clear to auscultation - no rales, rhonchi or wheezes, prolonged expiratory phase, frequent, dry cough, negative squeeze test, no obvious concerns with rib pain  CV: regular rate and rhythm, normal S1 S2, no S3 or S4, no murmur  MS: no gross musculoskeletal defects  noted

## 2023-06-16 ENCOUNTER — ALLIED HEALTH/NURSE VISIT (OUTPATIENT)
Dept: FAMILY MEDICINE | Facility: CLINIC | Age: 38
End: 2023-06-16
Payer: COMMERCIAL

## 2023-06-16 DIAGNOSIS — Z30.9 CONTRACEPTION MANAGEMENT: Primary | ICD-10-CM

## 2023-06-16 PROCEDURE — 99207 PR NO CHARGE NURSE ONLY: CPT

## 2023-06-16 PROCEDURE — 96372 THER/PROPH/DIAG INJ SC/IM: CPT | Performed by: NURSE PRACTITIONER

## 2023-06-16 RX ADMIN — MEDROXYPROGESTERONE ACETATE 150 MG: 150 INJECTION, SUSPENSION INTRAMUSCULAR at 09:41

## 2023-06-16 NOTE — PROGRESS NOTES
Clinic Administered Medication Documentation      Depo Provera Documentation    Depo-Provera Standing Order inclusion/exclusion criteria reviewed.     Is this the initial or subsequent dose of Depo Provera? Subsequent dose - patient is within the acceptable window of time (11-15 weeks) for subsequent injection. Pregnancy test not indicated.    Patient meets: inclusion criteria     Is there an active order (written within the past 365 days, with administrations remaining, not ) in the chart? Yes.     Prior to injection, verified patient identity using patient's name and date of birth. Medication was administered. Please see MAR and medication order for additional information.     Vial/Syringe: Single dose vial. Was entire vial of medication used? Yes    Patient instructed to report any adverse reaction to staff immediately.  NEXT INJECTION DUE: 23 - 23    Verified that the patient has refills remaining in their prescription.

## 2023-07-02 ENCOUNTER — HEALTH MAINTENANCE LETTER (OUTPATIENT)
Age: 38
End: 2023-07-02

## 2023-09-13 ENCOUNTER — PATIENT OUTREACH (OUTPATIENT)
Dept: ONCOLOGY | Facility: CLINIC | Age: 38
End: 2023-09-13

## 2023-09-13 ENCOUNTER — OFFICE VISIT (OUTPATIENT)
Dept: FAMILY MEDICINE | Facility: CLINIC | Age: 38
End: 2023-09-13
Payer: COMMERCIAL

## 2023-09-13 VITALS
BODY MASS INDEX: 21.41 KG/M2 | RESPIRATION RATE: 16 BRPM | DIASTOLIC BLOOD PRESSURE: 84 MMHG | WEIGHT: 113.4 LBS | TEMPERATURE: 98.9 F | HEART RATE: 101 BPM | OXYGEN SATURATION: 99 % | HEIGHT: 61 IN | SYSTOLIC BLOOD PRESSURE: 136 MMHG

## 2023-09-13 DIAGNOSIS — Z00.00 ROUTINE GENERAL MEDICAL EXAMINATION AT A HEALTH CARE FACILITY: Primary | ICD-10-CM

## 2023-09-13 DIAGNOSIS — Z80.41 FAMILY HISTORY OF MALIGNANT NEOPLASM OF OVARY: ICD-10-CM

## 2023-09-13 DIAGNOSIS — J45.30 MILD PERSISTENT ASTHMA WITHOUT COMPLICATION: ICD-10-CM

## 2023-09-13 DIAGNOSIS — Z12.4 CERVICAL CANCER SCREENING: ICD-10-CM

## 2023-09-13 DIAGNOSIS — F41.9 ANXIETY: ICD-10-CM

## 2023-09-13 DIAGNOSIS — Z80.3 FAMILY HISTORY OF MALIGNANT NEOPLASM OF BREAST: ICD-10-CM

## 2023-09-13 PROCEDURE — 99213 OFFICE O/P EST LOW 20 MIN: CPT | Mod: 25 | Performed by: FAMILY MEDICINE

## 2023-09-13 PROCEDURE — 90651 9VHPV VACCINE 2/3 DOSE IM: CPT | Performed by: FAMILY MEDICINE

## 2023-09-13 PROCEDURE — 99395 PREV VISIT EST AGE 18-39: CPT | Mod: 25 | Performed by: FAMILY MEDICINE

## 2023-09-13 PROCEDURE — 90471 IMMUNIZATION ADMIN: CPT | Performed by: FAMILY MEDICINE

## 2023-09-13 PROCEDURE — 90686 IIV4 VACC NO PRSV 0.5 ML IM: CPT | Performed by: FAMILY MEDICINE

## 2023-09-13 PROCEDURE — G0145 SCR C/V CYTO,THINLAYER,RESCR: HCPCS | Performed by: FAMILY MEDICINE

## 2023-09-13 PROCEDURE — 87624 HPV HI-RISK TYP POOLED RSLT: CPT | Performed by: FAMILY MEDICINE

## 2023-09-13 PROCEDURE — 90472 IMMUNIZATION ADMIN EACH ADD: CPT | Performed by: FAMILY MEDICINE

## 2023-09-13 RX ORDER — ESCITALOPRAM OXALATE 10 MG/1
10 TABLET ORAL DAILY
Qty: 90 TABLET | Refills: 3 | Status: SHIPPED | OUTPATIENT
Start: 2023-09-13 | End: 2024-08-09

## 2023-09-13 RX ORDER — ALBUTEROL SULFATE 90 UG/1
2 AEROSOL, METERED RESPIRATORY (INHALATION) EVERY 6 HOURS PRN
Qty: 18 G | Refills: 11 | Status: SHIPPED | OUTPATIENT
Start: 2023-09-13 | End: 2024-08-09

## 2023-09-13 RX ORDER — FLUTICASONE PROPIONATE 110 UG/1
2 AEROSOL, METERED RESPIRATORY (INHALATION) 2 TIMES DAILY
Qty: 12 G | Refills: 11 | Status: SHIPPED | OUTPATIENT
Start: 2023-09-13 | End: 2024-08-09

## 2023-09-13 ASSESSMENT — ENCOUNTER SYMPTOMS
DYSURIA: 0
MYALGIAS: 0
HEMATURIA: 0
CHILLS: 0
CONSTIPATION: 0
HEARTBURN: 0
PARESTHESIAS: 0
HEMATOCHEZIA: 0
NAUSEA: 0
ABDOMINAL PAIN: 0
NERVOUS/ANXIOUS: 1
WEAKNESS: 0
JOINT SWELLING: 0
BREAST MASS: 0
DIARRHEA: 0
SHORTNESS OF BREATH: 0
EYE PAIN: 0
DIZZINESS: 0
HEADACHES: 0
FREQUENCY: 0
COUGH: 0
FEVER: 0
SORE THROAT: 0
ARTHRALGIAS: 0
PALPITATIONS: 0

## 2023-09-13 ASSESSMENT — ASTHMA QUESTIONNAIRES: ACT_TOTALSCORE: 25

## 2023-09-13 NOTE — PROGRESS NOTES
Writer received Cancer Risk Management Program referral, referred for:     Family history of malignant neoplasm of breast  Family history of malignant neoplasm of ovary      Reviewed for appropriate plan, and sent to New Patient Scheduling for completion.

## 2023-09-13 NOTE — PROGRESS NOTES
SUBJECTIVE:   CC: Krista is an 38 year old who presents for preventive health visit.       Healthy Habits:     Getting at least 3 servings of Calcium per day:  Yes    Bi-annual eye exam:  Yes    Dental care twice a year:  Yes    Sleep apnea or symptoms of sleep apnea:  Daytime drowsiness    Diet:  Regular (no restrictions)    Frequency of exercise:  2-3 days/week    Duration of exercise:  15-30 minutes    Taking medications regularly:  Yes    Medication side effects:  None    Additional concerns today:  Yes    Concerns:  ADHD  Anxiety   Contraception   She is currently on the depo . She is interested in trying the pills again   She has been having more anxiety she has had the lexapro in the past she has been on this but stopped she thought it did help   She had been diagnosed with adhd and has not been treated in the past 20 years she is anxious and she would                   Social History     Tobacco Use    Smoking status: Former    Smokeless tobacco: Never   Substance Use Topics    Alcohol use: Not Currently     Comment: very little             9/13/2023    10:20 AM   Alcohol Use   Prescreen: >3 drinks/day or >7 drinks/week? No     Reviewed orders with patient.  Reviewed health maintenance and updated orders accordingly - Yes      Breast Cancer Screening:  Any new diagnosis of family breast, ovarian, or bowel cancer? No    FHS-7:       5/5/2022     7:36 AM 9/13/2023    10:22 AM   Breast CA Risk Assessment (FHS-7)   Did any of your first-degree relatives have breast or ovarian cancer? Unknown No   Did any of your relatives have bilateral breast cancer? Yes Yes   Did any man in your family have breast cancer?  No   Did any woman in your family have breast and ovarian cancer?  Yes   Did any woman in your family have breast cancer before age 50 y?  Yes   Do you have 2 or more relatives with breast and/or ovarian cancer?  Yes   Do you have 2 or more relatives with breast and/or bowel cancer?  No     Great aunt  "with ovarian cancer aunt with breast cancer   Patient under 40 years of age: Routine Mammogram Screening not recommended.   Pertinent mammograms are reviewed under the imaging tab.    History of abnormal Pap smear: YES - updated in Problem List and Health Maintenance accordingly      Latest Ref Rng & Units 5/5/2022     8:11 AM 3/12/2021     2:00 PM 3/12/2021     1:52 PM   PAP / HPV   PAP  Negative for Intraepithelial Lesion or Malignancy (NILM)      PAP (Historical)    NIL    HPV 16 DNA Negative Negative  Negative     HPV 18 DNA Negative Positive  Positive     Other HR HPV Negative Negative  Positive       Reviewed and updated as needed this visit by clinical staff   Tobacco  Allergies  Meds              Reviewed and updated as needed this visit by Provider                     Review of Systems   Constitutional:  Negative for chills and fever.   HENT:  Negative for congestion, ear pain, hearing loss and sore throat.    Eyes:  Negative for pain and visual disturbance.   Respiratory:  Negative for cough and shortness of breath.    Cardiovascular:  Negative for chest pain, palpitations and peripheral edema.   Gastrointestinal:  Negative for abdominal pain, constipation, diarrhea, heartburn, hematochezia and nausea.   Breasts:  Negative for tenderness, breast mass and discharge.   Genitourinary:  Negative for dysuria, frequency, genital sores, hematuria, pelvic pain, urgency, vaginal bleeding and vaginal discharge.   Musculoskeletal:  Negative for arthralgias, joint swelling and myalgias.   Skin:  Negative for rash.   Neurological:  Negative for dizziness, weakness, headaches and paresthesias.   Psychiatric/Behavioral:  Positive for mood changes. The patient is nervous/anxious.           OBJECTIVE:   /84   Pulse 101   Temp 98.9  F (37.2  C) (Tympanic)   Resp 16   Ht 1.537 m (5' 0.5\")   Wt 51.4 kg (113 lb 6.4 oz)   SpO2 99%   BMI 21.78 kg/m    Physical Exam  GENERAL: healthy, alert and no distress  EYES: " Eyes grossly normal to inspection, PERRL and conjunctivae and sclerae normal  HENT: ear canals and TM's normal, nose and mouth without ulcers or lesions  NECK: no adenopathy, no asymmetry, masses, or scars and thyroid normal to palpation  RESP: lungs clear to auscultation - no rales, rhonchi or wheezes  BREAST: normal without masses, tenderness or nipple discharge and no palpable axillary masses or adenopathy  BREAST: normal without masses, tenderness or nipple discharge, no palpable axillary masses or adenopathy, and bilateral nipple piercing  CV: regular rate and rhythm, normal S1 S2, no S3 or S4, no murmur, click or rub, no peripheral edema and peripheral pulses strong  ABDOMEN: soft, nontender, no hepatosplenomegaly, no masses and bowel sounds normal   (female): normal female external genitalia, normal urethral meatus, vaginal mucosa pink, moist, well rugated, and normal cervix/adnexa/uterus without masses or discharge  MS: no gross musculoskeletal defects noted, no edema  SKIN: no suspicious lesions or rashes  NEURO: Normal strength and tone, mentation intact and speech normal  PSYCH: mentation appears normal, affect normal/bright    Diagnostic Test Results:  Labs reviewed in Epic  No results found for any visits on 09/13/23.    ASSESSMENT/PLAN:   (Z00.00) Routine general medical examination at a health care facility  (primary encounter diagnosis)  Comment:   Plan:     (Z12.4) Cervical cancer screening  Comment:   Plan: Pap Screen with HPV - recommended age 30 - 65         years, HPV Hold (Lab Only)            (F41.9) Anxiety  Comment:   Plan: escitalopram (LEXAPRO) 10 MG tablet        This works well for her and she would like to continue     (J45.30) Mild persistent asthma without complication  Comment:   Plan: fluticasone (FLOVENT HFA) 110 MCG/ACT inhaler,         albuterol (PROAIR HFA/PROVENTIL HFA/VENTOLIN         HFA) 108 (90 Base) MCG/ACT inhaler        Needs refill     (Z80.3) Family history of  malignant neoplasm of breast  Comment:   Plan: Adult Genetics & Metabolism Referral        Recommended that she meet with them     (Z80.41) Family history of malignant neoplasm of ovary  Comment:   Plan: Adult Genetics & Metabolism Referral                  COUNSELING:  Reviewed preventive health counseling, as reflected in patient instructions          She reports that she has quit smoking. She has never used smokeless tobacco.          Giuliana Bliss MD  [facility]

## 2023-09-13 NOTE — LETTER
My Asthma Action Plan    Name: Krista Reddy   YOB: 1985  Date: 9/13/2023   My doctor: Giuliana Bliss MD   My clinic: [facility]        My Control Medicine: Fluticasone propionate (Flovent HFA) - 110 mcg 2 puffs twice daily  My Rescue Medicine: Albuterol (Proair/Ventolin/Proventil HFA) 2-4 puffs EVERY 4 HOURS as needed. Use a spacer if recommended by your provider.   My Asthma Severity:   Mild Persistent  Know your asthma triggers:   smoke  upper respiratory infections  pollens  humidity            GREEN ZONE   Good Control  I feel good  No cough or wheeze  Can work, sleep and play without asthma symptoms       Take your asthma control medicine every day.     If exercise triggers your asthma, take your rescue medication  15 minutes before exercise or sports, and  During exercise if you have asthma symptoms  Spacer to use with inhaler: If you have a spacer, make sure to use it with your inhaler             YELLOW ZONE Getting Worse  I have ANY of these:  I do not feel good  Cough or wheeze  Chest feels tight  Wake up at night   Keep taking your Green Zone medications  Start taking your rescue medicine:  every 20 minutes for up to 1 hour. Then every 4 hours for 24-48 hours.  If you stay in the Yellow Zone for more than 12-24 hours, contact your doctor.  If you do not return to the Green Zone in 12-24 hours or you get worse, start taking your oral steroid medicine if prescribed by your provider.           RED ZONE Medical Alert - Get Help  I have ANY of these:  I feel awful  Medicine is not helping  Breathing getting harder  Trouble walking or talking  Nose opens wide to breathe       Take your rescue medicine NOW  If your provider has prescribed an oral steroid medicine, start taking it NOW  Call your doctor NOW  If you are still in the Red Zone after 20 minutes and you have not reached your doctor:  Take your rescue medicine again and  Call 911 or go to the emergency room right  away    See your regular doctor within 2 weeks of an Emergency Room or Urgent Care visit for follow-up treatment.          Annual Reminders:  Meet with Asthma Educator,  Flu Shot in the Fall, consider Pneumonia Vaccination for patients with asthma (aged 19 and older).    Pharmacy[place]    [facility] [place] [address] [facility] [place] [address]    Electronically signed by Giuliana Bliss MD   Date: 09/13/23                      Asthma Triggers  How To Control Things That Make Your Asthma Worse    Triggers are things that make your asthma worse.  Look at the list below to help you find your triggers and what you can do about them.  You can help prevent asthma flare-ups by staying away from your triggers.      Trigger                                                          What you can do   Cigarette Smoke  Tobacco smoke can make asthma worse. Do not allow smoking in your home, car or around you.  Be sure no one smokes at a child s day care or school.  If you smoke, ask your health care provider for ways to help you quit.  Ask family members to quit too.  Ask your health care provider for a referral to Quit Plan to help you quit smoking, or call 0-646-983-PLAN.     Colds, Flu, Bronchitis  These are common triggers of asthma. Wash your hands often.  Don t touch your eyes, nose or mouth.  Get a flu shot every year.     Dust Mites  These are tiny bugs that live in cloth or carpet. They are too small to see. Wash sheets and blankets in hot water every week.   Encase pillows and mattress in dust mite proof covers.  Avoid having carpet if you can. If you have carpet, vacuum weekly.   Use a dust mask and HEPA vacuum.   Pollen and Outdoor Mold  Some people are allergic to trees, grass, or weed pollen, or molds. Try to keep your windows closed.  Limit time out doors when pollen count is high.   Ask you health care provider about taking medicine during  allergy season.     Animal Dander  Some people are allergic to skin flakes, urine or saliva from pets with fur or feathers. Keep pets with fur or feathers out of your home.    If you can t keep the pet outdoors, then keep the pet out of your bedroom.  Keep the bedroom door closed.  Keep pets off cloth furniture and away from stuffed toys.     Mice, Rats, and Cockroaches   Some people are allergic to the waste from these pests.   Cover food and garbage.  Clean up spills and food crumbs.  Store grease in the refrigerator.   Keep food out of the bedroom.   Indoor Mold  This can be a trigger if your home has high moisture. Fix leaking faucets, pipes, or other sources of water.   Clean moldy surfaces.  Dehumidify basement if it is damp and smelly.   Smoke, Strong Odors, and Sprays  These can reduce air quality. Stay away from strong odors and sprays, such as perfume, powder, hair spray, paints, smoke incense, paint, cleaning products, candles and new carpet.   Exercise or Sports  Some people with asthma have this trigger. Be active!  Ask your doctor about taking medicine before sports or exercise to prevent symptoms.    Warm up for 5-10 minutes before and after sports or exercise.     Other Triggers of Asthma  Cold air:  Cover your nose and mouth with a scarf.  Sometimes laughing or crying can be a trigger.  Some medicines and food can trigger asthma.

## 2023-09-20 ENCOUNTER — PATIENT OUTREACH (OUTPATIENT)
Dept: FAMILY MEDICINE | Facility: CLINIC | Age: 38
End: 2023-09-20
Payer: COMMERCIAL

## 2023-09-20 DIAGNOSIS — R87.810 CERVICAL HIGH RISK HPV (HUMAN PAPILLOMAVIRUS) TEST POSITIVE: Primary | ICD-10-CM

## 2023-09-20 NOTE — LETTER
March 29, 2024      Krista Reddy  [address]  [place] [address]        Dear ,    At [facility], your health and wellness are our primary concern. That is why we are following up on your most recent positive high-risk Human Papillomavirus (HPV) test.    Please call [phone] to schedule an appointment for your recommended follow-up Colposcopy (this cannot be scheduled through [facility]) at your earliest convenience. If you have chosen not to do the recommended colposcopy, please contact your clinic to schedule an appointment for a repeat Pap smear and Human Papillomavirus (HPV) test at this time.    If you have completed the appointment outside of the [facility] system, please have the records forwarded to our office. We will update your chart for your provider to review before your next annual wellness visit.     Thank you for choosing [facility]!      Sincerely,    Your [facility] Care Team

## 2023-10-05 NOTE — LETTER
March 10, 2021      Krista Reddy  [address]  [place] [facility] [address]        To Whom It May Concern:    Krista Reddy  was seen on 3/8/2021.  Please excuse her  until 3/13/2021 due to illness.        Sincerely,        RAVI Grewal CNP     No care due was identified.  [facility] Embedded Care Due Messages. Reference number: [id number].   10/05/2023 6:49:16 PM CDT

## 2024-01-31 ENCOUNTER — ALLIED HEALTH/NURSE VISIT (OUTPATIENT)
Dept: FAMILY MEDICINE | Facility: CLINIC | Age: 39
End: 2024-01-31
Payer: COMMERCIAL

## 2024-01-31 DIAGNOSIS — Z23 ENCOUNTER FOR IMMUNIZATION: Primary | ICD-10-CM

## 2024-01-31 PROCEDURE — 99207 PR NO CHARGE NURSE ONLY: CPT

## 2024-01-31 PROCEDURE — 90471 IMMUNIZATION ADMIN: CPT

## 2024-01-31 PROCEDURE — 90651 9VHPV VACCINE 2/3 DOSE IM: CPT

## 2024-01-31 NOTE — NURSING NOTE
Prior to immunization administration, verified patients identity using patient s name and date of birth. Please see Immunization Activity for additional information.     Screening Questionnaire for Adult Immunization    Are you sick today?   No   Do you have allergies to medications, food, a vaccine component or latex?   No   Have you ever had a serious reaction after receiving a vaccination?   No   Do you have a long-term health problem with heart, lung, kidney, or metabolic disease (e.g., diabetes), asthma, a blood disorder, no spleen, complement component deficiency, a cochlear implant, or a spinal fluid leak?  Are you on long-term aspirin therapy?   No   Do you have cancer, leukemia, HIV/AIDS, or any other immune system problem?   No   Do you have a parent, brother, or sister with an immune system problem?   No   In the past 3 months, have you taken medications that affect  your immune system, such as prednisone, other steroids, or anticancer drugs; drugs for the treatment of rheumatoid arthritis, Crohn s disease, or psoriasis; or have you had radiation treatments?   No   Have you had a seizure, or a brain or other nervous system problem?   No   During the past year, have you received a transfusion of blood or blood    products, or been given immune (gamma) globulin or antiviral drug?   No   For women: Are you pregnant or is there a chance you could become       pregnant during the next month?   No   Have you received any vaccinations in the past 4 weeks?   No     Immunization questionnaire answers were all negative.    I have reviewed the following standing orders:   This patient is due and qualifies for the HPV vaccine.    Click here for HPV (Adult 15-45Y) Standing Order     I have reviewed the vaccines inclusion and exclusion criteria;No concerns regarding eligibility.       Patient instructed to remain in clinic for 15 minutes afterwards, and to report any adverse reactions.     Screening performed by Lorena  OSEI Bolanos on 1/31/2024 at 11:51 AM.

## 2024-03-28 NOTE — TELEPHONE ENCOUNTER
Patient due for Colposcopy.    Reminder done today via telephone call.    Reached fast busy signal

## 2024-03-29 NOTE — TELEPHONE ENCOUNTER
Patient due for Colposcopy.    Reminder done today via telephone call.    fast busy signal. Additional reminder letter sent.

## 2024-04-29 NOTE — TELEPHONE ENCOUNTER
FYI to provider - Patient is lost to pap tracking follow-up. Attempts to contact pt have been made per reminder process and there has been no reply and/or no appt scheduled. Contact hx listed below.     2004 - 2011 NIL Paps (Care Everywhere)  5/22/14 ASCUS Pap, Neg HPV.  2/28/17 NIL Pap, + HR HPV (Neg 16/18). Plan cotest in 1 year.   3/29/18 ASCUS Pap, + HR HPV 16 & other HR HPV. Plan colp  4/24/18 Pablo Bx and ECC negative. Plan: Co-test in 1 year due by 4/24/19.  2/21/19 NIL Pap, +HR HPV 16 & other. Plan colp  3/19/19 Pablo BX and ECC- Benign. Plan cotest in 1 year.  1/16/20 ASCUS pap with Neg HPV.  Plan cotest in 1 year.  3/12/21 NIL pap, +HR HPV 18 & other. Plan colp due bef 6/12/21.   5/10/21 Colpo No dysplasia. ECC- Non diagnostic. Plan cotest in 1 year   5/5/22 NIL pap, +HR HPV 18. Plan: colposcopy due before 8/5/22 12/19/22 Lost to follow-up for pap tracking   9/13/23 NIL pap, +HR HPV 18. Plan: colposcopy due before 12/13/23 9/22/23 Pt notified  10/11/23 Pablo appt -- no show  11/8/23 Reminder mychart  12/13/23 Pablo not done. Tracking updated for 6 mo colp/pap due 3/13/24.    2/22/24 Reminder mychart  3/28/24 Reminder call -- fast busy signal  3/29/24 Reminder call -- fast busy signal. Additional reminder letter sent.  4/29/24 Lost to follow-up for pap tracking     Flavia Pichardo RN BSN, Pap Tracking

## 2024-04-30 ENCOUNTER — ALLIED HEALTH/NURSE VISIT (OUTPATIENT)
Dept: FAMILY MEDICINE | Facility: CLINIC | Age: 39
End: 2024-04-30
Payer: COMMERCIAL

## 2024-04-30 DIAGNOSIS — Z23 ENCOUNTER FOR IMMUNIZATION: Primary | ICD-10-CM

## 2024-04-30 PROCEDURE — 99207 PR NO CHARGE NURSE ONLY: CPT

## 2024-04-30 PROCEDURE — 90471 IMMUNIZATION ADMIN: CPT

## 2024-04-30 PROCEDURE — 90651 9VHPV VACCINE 2/3 DOSE IM: CPT

## 2024-04-30 NOTE — PROGRESS NOTES
Prior to immunization administration, verified patients identity using patient s name and date of birth. Please see Immunization Activity for additional information.     Screening Questionnaire for Adult Immunization    Are you sick today?   No   Do you have allergies to medications, food, a vaccine component or latex?   No   Have you ever had a serious reaction after receiving a vaccination?   No   Do you have a long-term health problem with heart, lung, kidney, or metabolic disease (e.g., diabetes), asthma, a blood disorder, no spleen, complement component deficiency, a cochlear implant, or a spinal fluid leak?  Are you on long-term aspirin therapy?   No   Do you have cancer, leukemia, HIV/AIDS, or any other immune system problem?   No   Do you have a parent, brother, or sister with an immune system problem?   No   In the past 3 months, have you taken medications that affect  your immune system, such as prednisone, other steroids, or anticancer drugs; drugs for the treatment of rheumatoid arthritis, Crohn s disease, or psoriasis; or have you had radiation treatments?   No   Have you had a seizure, or a brain or other nervous system problem?   No   During the past year, have you received a transfusion of blood or blood    products, or been given immune (gamma) globulin or antiviral drug?   No   For women: Are you pregnant or is there a chance you could become       pregnant during the next month?   No   Have you received any vaccinations in the past 4 weeks?   No     Immunization questionnaire answers were all negative.    I have reviewed the following standing orders:   This patient is due and qualifies for the HPV vaccine.    Click here for HPV (Adult 15-45Y) Standing Order     I have reviewed the vaccines inclusion and exclusion criteria;No concerns regarding eligibility.       Patient instructed to remain in clinic for 15 minutes afterwards, and to report any adverse reactions.     Screening performed by Beth  RUBY Yeager RN on 4/30/2024 at 11:40 AM.

## 2024-06-25 ENCOUNTER — ALLIED HEALTH/NURSE VISIT (OUTPATIENT)
Dept: FAMILY MEDICINE | Facility: CLINIC | Age: 39
End: 2024-06-25

## 2024-06-25 DIAGNOSIS — Z11.1 VISIT FOR MANTOUX TEST: Primary | ICD-10-CM

## 2024-06-25 PROCEDURE — 86580 TB INTRADERMAL TEST: CPT

## 2024-06-25 PROCEDURE — 99207 PR NO CHARGE NURSE ONLY: CPT

## 2024-06-25 NOTE — PROGRESS NOTES
Patient is here today for a Mantoux (TST) test placement.    Is there a current order in the chart? Yes    Reason for Mantoux (TST) in patient's own words: She is needing it for work    Patient needs form signed? No - form not needed per patient.    Instructed patient to wait for 15 minutes post injection and to report any reactions immediately to staff.    Told patient to return to clinic in 48-72 hours to have Mantoux (TST) read.       Rachel Sanchez,CMA

## 2024-06-28 ENCOUNTER — ALLIED HEALTH/NURSE VISIT (OUTPATIENT)
Dept: FAMILY MEDICINE | Facility: CLINIC | Age: 39
End: 2024-06-28

## 2024-06-28 DIAGNOSIS — Z11.1 VISIT FOR MANTOUX TEST: Primary | ICD-10-CM

## 2024-06-28 LAB
PPDINDURATION: 0 MM (ref 0–4.99)
PPDREDNESS: NORMAL

## 2024-06-28 PROCEDURE — 99207 PR NO CHARGE NURSE ONLY: CPT

## 2024-06-28 NOTE — PROGRESS NOTES
Patient is here today for a Mantoux (TST) test results.    Did patient return to clinic 48-72 hours from Mantoux (TST) placement: Yes -     PPD Induration   Date Value Ref Range Status   06/28/2024 0 0 - 4.99 mm Final     PPD Redness   Date Value Ref Range Status   06/28/2024 Not Present  Final       Induration Size? Induration <5mm - Enter results in Enter/Edit Activity. Route results to ordering provider.     Patient needs form signed? No    Patient reports having previously had the BCG Vaccine: No    Does patient need a two step? No    Negative Mantoux Result      Celio Tran, Clinic RN  [facility]

## 2024-08-09 ENCOUNTER — OFFICE VISIT (OUTPATIENT)
Dept: FAMILY MEDICINE | Facility: CLINIC | Age: 39
End: 2024-08-09

## 2024-08-09 VITALS
RESPIRATION RATE: 20 BRPM | HEIGHT: 61 IN | TEMPERATURE: 98.2 F | HEART RATE: 96 BPM | OXYGEN SATURATION: 98 % | WEIGHT: 114.2 LBS | BODY MASS INDEX: 21.56 KG/M2 | SYSTOLIC BLOOD PRESSURE: 129 MMHG | DIASTOLIC BLOOD PRESSURE: 88 MMHG

## 2024-08-09 DIAGNOSIS — R09.81 CONGESTION OF PARANASAL SINUS: ICD-10-CM

## 2024-08-09 DIAGNOSIS — J45.30 MILD PERSISTENT ASTHMA WITHOUT COMPLICATION: ICD-10-CM

## 2024-08-09 DIAGNOSIS — K21.9 GASTROESOPHAGEAL REFLUX DISEASE WITHOUT ESOPHAGITIS: ICD-10-CM

## 2024-08-09 DIAGNOSIS — J45.901 EXACERBATION OF ASTHMA, UNSPECIFIED ASTHMA SEVERITY, UNSPECIFIED WHETHER PERSISTENT: Primary | ICD-10-CM

## 2024-08-09 DIAGNOSIS — F41.9 ANXIETY: ICD-10-CM

## 2024-08-09 PROCEDURE — 99214 OFFICE O/P EST MOD 30 MIN: CPT | Performed by: PHYSICIAN ASSISTANT

## 2024-08-09 RX ORDER — ESCITALOPRAM OXALATE 10 MG/1
10 TABLET ORAL DAILY
Qty: 90 TABLET | Refills: 0 | Status: SHIPPED | OUTPATIENT
Start: 2024-08-09

## 2024-08-09 RX ORDER — FLUTICASONE PROPIONATE 110 UG/1
2 AEROSOL, METERED RESPIRATORY (INHALATION) 2 TIMES DAILY
Qty: 12 G | Refills: 3 | Status: SHIPPED | OUTPATIENT
Start: 2024-08-09

## 2024-08-09 RX ORDER — ALBUTEROL SULFATE 90 UG/1
2 AEROSOL, METERED RESPIRATORY (INHALATION) EVERY 6 HOURS PRN
Qty: 18 G | Refills: 3 | Status: SHIPPED | OUTPATIENT
Start: 2024-08-09

## 2024-08-09 RX ORDER — CETIRIZINE HYDROCHLORIDE 10 MG/1
10 TABLET ORAL DAILY PRN
Qty: 90 TABLET | Refills: 3 | Status: SHIPPED | OUTPATIENT
Start: 2024-08-09

## 2024-08-09 RX ORDER — PREDNISONE 20 MG/1
40 TABLET ORAL DAILY
Qty: 10 TABLET | Refills: 0 | Status: SHIPPED | OUTPATIENT
Start: 2024-08-09 | End: 2024-08-14

## 2024-08-09 RX ORDER — FAMOTIDINE 20 MG/1
20 TABLET, FILM COATED ORAL 2 TIMES DAILY
Qty: 180 TABLET | Refills: 3 | Status: SHIPPED | OUTPATIENT
Start: 2024-08-09

## 2024-08-09 RX ORDER — FLUTICASONE PROPIONATE 50 MCG
1 SPRAY, SUSPENSION (ML) NASAL DAILY
Qty: 16 G | Refills: 11 | Status: SHIPPED | OUTPATIENT
Start: 2024-08-09

## 2024-08-09 ASSESSMENT — ASTHMA QUESTIONNAIRES
QUESTION_3 LAST FOUR WEEKS HOW OFTEN DID YOUR ASTHMA SYMPTOMS (WHEEZING, COUGHING, SHORTNESS OF BREATH, CHEST TIGHTNESS OR PAIN) WAKE YOU UP AT NIGHT OR EARLIER THAN USUAL IN THE MORNING: NOT AT ALL
QUESTION_4 LAST FOUR WEEKS HOW OFTEN HAVE YOU USED YOUR RESCUE INHALER OR NEBULIZER MEDICATION (SUCH AS ALBUTEROL): ONCE A WEEK OR LESS
QUESTION_1 LAST FOUR WEEKS HOW MUCH OF THE TIME DID YOUR ASTHMA KEEP YOU FROM GETTING AS MUCH DONE AT WORK, SCHOOL OR AT HOME: NONE OF THE TIME
QUESTION_5 LAST FOUR WEEKS HOW WOULD YOU RATE YOUR ASTHMA CONTROL: SOMEWHAT CONTROLLED
ACT_TOTALSCORE: 21
ACT_TOTALSCORE: 21
QUESTION_2 LAST FOUR WEEKS HOW OFTEN HAVE YOU HAD SHORTNESS OF BREATH: ONCE OR TWICE A WEEK

## 2024-08-09 NOTE — PROGRESS NOTES
Assessment & Plan     Exacerbation of asthma, unspecified asthma severity, unspecified whether persistent  Patient reports similar symptoms in the past. Suggest repeat prednisone. She is agreeable.   - predniSONE (DELTASONE) 20 MG tablet; Take 2 tablets (40 mg) by mouth daily for 5 days    Mild persistent asthma without complication  Refilled.   - albuterol (PROAIR HFA/PROVENTIL HFA/VENTOLIN HFA) 108 (90 Base) MCG/ACT inhaler; Inhale 2 puffs into the lungs every 6 hours as needed for shortness of breath  - fluticasone (FLOVENT HFA) 110 MCG/ACT inhaler; Inhale 2 puffs into the lungs 2 times daily  - cetirizine (ZYRTEC) 10 MG tablet; Take 1 tablet (10 mg) by mouth daily as needed for allergies (seasonal allergies)    Congestion of paranasal sinus  Refilled.   - fluticasone (FLONASE) 50 MCG/ACT nasal spray; Spray 1 spray into both nostrils daily    Gastroesophageal reflux disease without esophagitis  Refilled.   - famotidine (PEPCID) 20 MG tablet; Take 1 tablet (20 mg) by mouth 2 times daily    Anxiety  Refilled 90 days. Suggest routine physical in 2-3 months.   - escitalopram (LEXAPRO) 10 MG tablet; Take 1 tablet (10 mg) by mouth daily                Gary Velasco is a 39 year old, presenting for the following health issues:  Asthma (Flare up x 3 days, wheezing and pain in right lower rib )        8/9/2024    10:16 AM   Additional Questions   Roomed by An V.         8/9/2024    10:16 AM   Patient Reported Additional Medications   Patient reports taking the following new medications none     History of Present Illness     Asthma:  She presents for follow up of asthma.  She has no cough, some wheezing, and some shortness of breath.  She is using a relief medication a few times a week. She typically misses taking her controller medication 1 time(s) per week. Patient is aware of the following triggers: animal dander, cold air, emotions, exercise or sports, humidity, smoke, strong odors and fumes and upper  "respiratory infections. The patient has not had a visit to the Emergency Room, Urgent Care or Hospital due to asthma since the last clinic visit.     She eats 0-1 servings of fruits and vegetables daily.She consumes 1 sweetened beverage(s) daily.She exercises with enough effort to increase her heart rate 10 to 19 minutes per day.  She exercises with enough effort to increase her heart rate 5 days per week.   She is taking medications regularly.       RUQ pain, rib pain. Was seen a year ago and felt the symptom was worse - had a bad cough.     Pain started on Sunday. Is feeling short of breath. Has a mild cough. Pain to take a deep breath, pain to cough.                 Review of Systems  Constitutional, HEENT, cardiovascular, pulmonary, gi and gu systems are negative, except as otherwise noted.      Objective    /88   Pulse 96   Temp 98.2  F (36.8  C) (Oral)   Resp 20   Ht 1.54 m (5' 0.63\")   Wt 51.8 kg (114 lb 3.2 oz)   LMP 08/04/2024   SpO2 98%   BMI 21.84 kg/m    Body mass index is 21.84 kg/m .  Physical Exam   GENERAL: alert and no distress  NECK: no adenopathy, no asymmetry, masses, or scars  RESP: lungs clear to auscultation - no rales, rhonchi or wheezes  CV: regular rate and rhythm, normal S1 S2, no S3 or S4, no murmur, click or rub, no peripheral edema  MS: no gross musculoskeletal defects noted, no edema            Signed Electronically by: Roslyn Montanez PA-C    "

## 2024-08-14 ENCOUNTER — PATIENT OUTREACH (OUTPATIENT)
Dept: CARE COORDINATION | Facility: CLINIC | Age: 39
End: 2024-08-14

## 2024-11-03 ENCOUNTER — HEALTH MAINTENANCE LETTER (OUTPATIENT)
Age: 39
End: 2024-11-03

## 2024-12-06 ENCOUNTER — LAB REQUISITION (OUTPATIENT)
Dept: LAB | Facility: HOSPITAL | Age: 39
End: 2024-12-06

## 2024-12-06 PROCEDURE — 36415 COLL VENOUS BLD VENIPUNCTURE: CPT | Performed by: INTERNAL MEDICINE

## 2024-12-06 PROCEDURE — 86481 TB AG RESPONSE T-CELL SUSP: CPT | Performed by: INTERNAL MEDICINE

## 2024-12-08 LAB
GAMMA INTERFERON BACKGROUND BLD IA-ACNC: 0.02 IU/ML
M TB IFN-G BLD-IMP: NEGATIVE
M TB IFN-G CD4+ BCKGRND COR BLD-ACNC: 9.98 IU/ML
MITOGEN IGNF BCKGRD COR BLD-ACNC: 0.01 IU/ML
MITOGEN IGNF BCKGRD COR BLD-ACNC: 0.03 IU/ML
QUANTIFERON MITOGEN: 10 IU/ML
QUANTIFERON NIL TUBE: 0.02 IU/ML
QUANTIFERON TB1 TUBE: 0.03 IU/ML
QUANTIFERON TB2 TUBE: 0.05

## 2025-08-02 ENCOUNTER — HEALTH MAINTENANCE LETTER (OUTPATIENT)
Age: 40
End: 2025-08-02